# Patient Record
Sex: FEMALE | URBAN - METROPOLITAN AREA
[De-identification: names, ages, dates, MRNs, and addresses within clinical notes are randomized per-mention and may not be internally consistent; named-entity substitution may affect disease eponyms.]

---

## 2024-04-29 ENCOUNTER — HOSPITAL ENCOUNTER (INPATIENT)
Facility: MEDICAL CENTER | Age: 54
DRG: 917 | End: 2024-04-29
Attending: EMERGENCY MEDICINE | Admitting: INTERNAL MEDICINE
Payer: COMMERCIAL

## 2024-04-29 DIAGNOSIS — Z97.8 ENDOTRACHEALLY INTUBATED: ICD-10-CM

## 2024-04-29 DIAGNOSIS — T50.902A INTENTIONAL DRUG OVERDOSE, INITIAL ENCOUNTER (HCC): Primary | ICD-10-CM

## 2024-04-29 DIAGNOSIS — R40.2431 GLASGOW COMA SCALE TOTAL SCORE 3-8, IN THE FIELD (EMT OR AMBULANCE) (HCC): ICD-10-CM

## 2024-04-29 PROBLEM — F32.9 MAJOR DEPRESSIVE DISORDER: Status: ACTIVE | Noted: 2024-04-29

## 2024-04-29 PROBLEM — T17.908A ASPIRATION INTO RESPIRATORY TRACT: Status: ACTIVE | Noted: 2024-04-29

## 2024-04-29 PROBLEM — E87.6 HYPOKALEMIA: Status: ACTIVE | Noted: 2024-04-29

## 2024-04-29 PROBLEM — E03.9 HYPOTHYROIDISM: Status: ACTIVE | Noted: 2024-04-29

## 2024-04-29 PROBLEM — E11.65 TYPE 2 DIABETES MELLITUS WITH HYPERGLYCEMIA (HCC): Status: ACTIVE | Noted: 2024-04-29

## 2024-04-29 PROBLEM — J96.01 ACUTE RESPIRATORY FAILURE WITH HYPOXIA (HCC): Status: ACTIVE | Noted: 2024-04-29

## 2024-04-29 LAB
ALBUMIN SERPL BCP-MCNC: 4.3 G/DL (ref 3.2–4.9)
ALBUMIN SERPL BCP-MCNC: 4.3 G/DL (ref 3.2–4.9)
ALBUMIN/GLOB SERPL: 1.5 G/DL
ALBUMIN/GLOB SERPL: 1.5 G/DL
ALP SERPL-CCNC: 123 U/L (ref 30–99)
ALP SERPL-CCNC: 123 U/L (ref 30–99)
ALT SERPL-CCNC: 49 U/L (ref 2–50)
ALT SERPL-CCNC: 49 U/L (ref 2–50)
AMPHET UR QL SCN: NEGATIVE
AMPHET UR QL SCN: NEGATIVE
ANION GAP SERPL CALC-SCNC: 17 MMOL/L (ref 7–16)
ANION GAP SERPL CALC-SCNC: 17 MMOL/L (ref 7–16)
APAP SERPL-MCNC: <5 UG/ML (ref 10–30)
APAP SERPL-MCNC: <5 UG/ML (ref 10–30)
ARTERIAL PATENCY WRIST A: ABNORMAL
ARTERIAL PATENCY WRIST A: ABNORMAL
AST SERPL-CCNC: 40 U/L (ref 12–45)
AST SERPL-CCNC: 40 U/L (ref 12–45)
BARBITURATES UR QL SCN: NEGATIVE
BARBITURATES UR QL SCN: NEGATIVE
BASE EXCESS BLDA CALC-SCNC: -6 MMOL/L (ref -4–3)
BASE EXCESS BLDA CALC-SCNC: -6 MMOL/L (ref -4–3)
BASOPHILS # BLD AUTO: 0.6 % (ref 0–1.8)
BASOPHILS # BLD AUTO: 0.6 % (ref 0–1.8)
BASOPHILS # BLD: 0.05 K/UL (ref 0–0.12)
BASOPHILS # BLD: 0.05 K/UL (ref 0–0.12)
BENZODIAZ UR QL SCN: NEGATIVE
BENZODIAZ UR QL SCN: NEGATIVE
BILIRUB SERPL-MCNC: 0.4 MG/DL (ref 0.1–1.5)
BILIRUB SERPL-MCNC: 0.4 MG/DL (ref 0.1–1.5)
BODY TEMPERATURE: ABNORMAL DEGREES
BODY TEMPERATURE: ABNORMAL DEGREES
BREATHS SETTING VENT: 20
BREATHS SETTING VENT: 20
BUN SERPL-MCNC: 16 MG/DL (ref 8–22)
BUN SERPL-MCNC: 16 MG/DL (ref 8–22)
BZE UR QL SCN: NEGATIVE
BZE UR QL SCN: NEGATIVE
CALCIUM ALBUM COR SERPL-MCNC: 8.2 MG/DL (ref 8.5–10.5)
CALCIUM ALBUM COR SERPL-MCNC: 8.2 MG/DL (ref 8.5–10.5)
CALCIUM SERPL-MCNC: 8.4 MG/DL (ref 8.5–10.5)
CALCIUM SERPL-MCNC: 8.4 MG/DL (ref 8.5–10.5)
CANNABINOIDS UR QL SCN: NEGATIVE
CANNABINOIDS UR QL SCN: NEGATIVE
CHLORIDE SERPL-SCNC: 100 MMOL/L (ref 96–112)
CHLORIDE SERPL-SCNC: 100 MMOL/L (ref 96–112)
CO2 BLDA-SCNC: 25 MMOL/L (ref 20–33)
CO2 BLDA-SCNC: 25 MMOL/L (ref 20–33)
CO2 SERPL-SCNC: 20 MMOL/L (ref 20–33)
CO2 SERPL-SCNC: 20 MMOL/L (ref 20–33)
CREAT SERPL-MCNC: 0.7 MG/DL (ref 0.5–1.4)
CREAT SERPL-MCNC: 0.7 MG/DL (ref 0.5–1.4)
DELSYS IDSYS: ABNORMAL
DELSYS IDSYS: ABNORMAL
EKG IMPRESSION: NORMAL
EKG IMPRESSION: NORMAL
EOSINOPHIL # BLD AUTO: 0.11 K/UL (ref 0–0.51)
EOSINOPHIL # BLD AUTO: 0.11 K/UL (ref 0–0.51)
EOSINOPHIL NFR BLD: 1.2 % (ref 0–6.9)
EOSINOPHIL NFR BLD: 1.2 % (ref 0–6.9)
ERYTHROCYTE [DISTWIDTH] IN BLOOD BY AUTOMATED COUNT: 43.8 FL (ref 35.9–50)
ERYTHROCYTE [DISTWIDTH] IN BLOOD BY AUTOMATED COUNT: 43.8 FL (ref 35.9–50)
ETHANOL BLD-MCNC: <10.1 MG/DL
ETHANOL BLD-MCNC: <10.1 MG/DL
FENTANYL UR QL: NEGATIVE
FENTANYL UR QL: NEGATIVE
GFR SERPLBLD CREATININE-BSD FMLA CKD-EPI: 101 ML/MIN/1.73 M 2
GFR SERPLBLD CREATININE-BSD FMLA CKD-EPI: 101 ML/MIN/1.73 M 2
GLOBULIN SER CALC-MCNC: 2.8 G/DL (ref 1.9–3.5)
GLOBULIN SER CALC-MCNC: 2.8 G/DL (ref 1.9–3.5)
GLUCOSE BLD STRIP.AUTO-MCNC: 261 MG/DL (ref 65–99)
GLUCOSE BLD STRIP.AUTO-MCNC: 261 MG/DL (ref 65–99)
GLUCOSE SERPL-MCNC: 290 MG/DL (ref 65–99)
GLUCOSE SERPL-MCNC: 290 MG/DL (ref 65–99)
HCO3 BLDA-SCNC: 23.1 MMOL/L (ref 17–25)
HCO3 BLDA-SCNC: 23.1 MMOL/L (ref 17–25)
HCT VFR BLD AUTO: 41.7 % (ref 37–47)
HCT VFR BLD AUTO: 41.7 % (ref 37–47)
HGB BLD-MCNC: 13.7 G/DL (ref 12–16)
HGB BLD-MCNC: 13.7 G/DL (ref 12–16)
HOROWITZ INDEX BLDA+IHG-RTO: 142 MM[HG]
HOROWITZ INDEX BLDA+IHG-RTO: 142 MM[HG]
IMM GRANULOCYTES # BLD AUTO: 0.1 K/UL (ref 0–0.11)
IMM GRANULOCYTES # BLD AUTO: 0.1 K/UL (ref 0–0.11)
IMM GRANULOCYTES NFR BLD AUTO: 1.1 % (ref 0–0.9)
IMM GRANULOCYTES NFR BLD AUTO: 1.1 % (ref 0–0.9)
LACTATE BLD-SCNC: 1.7 MMOL/L (ref 0.5–2)
LACTATE BLD-SCNC: 1.7 MMOL/L (ref 0.5–2)
LACTATE SERPL-SCNC: 2.4 MMOL/L (ref 0.5–2)
LACTATE SERPL-SCNC: 2.4 MMOL/L (ref 0.5–2)
LYMPHOCYTES # BLD AUTO: 1.18 K/UL (ref 1–4.8)
LYMPHOCYTES # BLD AUTO: 1.18 K/UL (ref 1–4.8)
LYMPHOCYTES NFR BLD: 13 % (ref 22–41)
LYMPHOCYTES NFR BLD: 13 % (ref 22–41)
MAGNESIUM SERPL-MCNC: 2 MG/DL (ref 1.5–2.5)
MAGNESIUM SERPL-MCNC: 2 MG/DL (ref 1.5–2.5)
MCH RBC QN AUTO: 29.2 PG (ref 27–33)
MCH RBC QN AUTO: 29.2 PG (ref 27–33)
MCHC RBC AUTO-ENTMCNC: 32.9 G/DL (ref 32.2–35.5)
MCHC RBC AUTO-ENTMCNC: 32.9 G/DL (ref 32.2–35.5)
MCV RBC AUTO: 88.9 FL (ref 81.4–97.8)
MCV RBC AUTO: 88.9 FL (ref 81.4–97.8)
METHADONE UR QL SCN: NEGATIVE
METHADONE UR QL SCN: NEGATIVE
MODE IMODE: ABNORMAL
MODE IMODE: ABNORMAL
MONOCYTES # BLD AUTO: 0.49 K/UL (ref 0–0.85)
MONOCYTES # BLD AUTO: 0.49 K/UL (ref 0–0.85)
MONOCYTES NFR BLD AUTO: 5.4 % (ref 0–13.4)
MONOCYTES NFR BLD AUTO: 5.4 % (ref 0–13.4)
NEUTROPHILS # BLD AUTO: 7.16 K/UL (ref 1.82–7.42)
NEUTROPHILS # BLD AUTO: 7.16 K/UL (ref 1.82–7.42)
NEUTROPHILS NFR BLD: 78.7 % (ref 44–72)
NEUTROPHILS NFR BLD: 78.7 % (ref 44–72)
NRBC # BLD AUTO: 0 K/UL
NRBC # BLD AUTO: 0 K/UL
NRBC BLD-RTO: 0 /100 WBC (ref 0–0.2)
NRBC BLD-RTO: 0 /100 WBC (ref 0–0.2)
O2/TOTAL GAS SETTING VFR VENT: 100 %
O2/TOTAL GAS SETTING VFR VENT: 100 %
OPIATES UR QL SCN: NEGATIVE
OPIATES UR QL SCN: NEGATIVE
OXYCODONE UR QL SCN: NEGATIVE
OXYCODONE UR QL SCN: NEGATIVE
PCO2 BLDA: 61.3 MMHG (ref 26–37)
PCO2 BLDA: 61.3 MMHG (ref 26–37)
PCO2 TEMP ADJ BLDA: 54.2 MMHG (ref 26–37)
PCO2 TEMP ADJ BLDA: 54.2 MMHG (ref 26–37)
PCP UR QL SCN: NEGATIVE
PCP UR QL SCN: NEGATIVE
PEEP END EXPIRATORY PRESSURE IPEEP: 8 CMH20
PEEP END EXPIRATORY PRESSURE IPEEP: 8 CMH20
PH BLDA: 7.18 [PH] (ref 7.4–7.5)
PH BLDA: 7.18 [PH] (ref 7.4–7.5)
PH TEMP ADJ BLDA: 7.22 [PH] (ref 7.4–7.5)
PH TEMP ADJ BLDA: 7.22 [PH] (ref 7.4–7.5)
PLATELET # BLD AUTO: 200 K/UL (ref 164–446)
PLATELET # BLD AUTO: 200 K/UL (ref 164–446)
PMV BLD AUTO: 10.7 FL (ref 9–12.9)
PMV BLD AUTO: 10.7 FL (ref 9–12.9)
PO2 BLDA: 142 MMHG (ref 64–87)
PO2 BLDA: 142 MMHG (ref 64–87)
PO2 TEMP ADJ BLDA: 126 MMHG (ref 64–87)
PO2 TEMP ADJ BLDA: 126 MMHG (ref 64–87)
POTASSIUM SERPL-SCNC: 2.9 MMOL/L (ref 3.6–5.5)
POTASSIUM SERPL-SCNC: 2.9 MMOL/L (ref 3.6–5.5)
PROPOXYPH UR QL SCN: NEGATIVE
PROPOXYPH UR QL SCN: NEGATIVE
PROT SERPL-MCNC: 7.1 G/DL (ref 6–8.2)
PROT SERPL-MCNC: 7.1 G/DL (ref 6–8.2)
RBC # BLD AUTO: 4.69 M/UL (ref 4.2–5.4)
RBC # BLD AUTO: 4.69 M/UL (ref 4.2–5.4)
SALICYLATES SERPL-MCNC: <1 MG/DL (ref 15–25)
SALICYLATES SERPL-MCNC: <1 MG/DL (ref 15–25)
SAO2 % BLDA: 98 % (ref 93–99)
SAO2 % BLDA: 98 % (ref 93–99)
SODIUM SERPL-SCNC: 137 MMOL/L (ref 135–145)
SODIUM SERPL-SCNC: 137 MMOL/L (ref 135–145)
SPECIMEN DRAWN FROM PATIENT: ABNORMAL
SPECIMEN DRAWN FROM PATIENT: ABNORMAL
TIDAL VOLUME IVT: 400 ML
TIDAL VOLUME IVT: 400 ML
TROPONIN T SERPL-MCNC: 36 NG/L (ref 6–19)
TROPONIN T SERPL-MCNC: 36 NG/L (ref 6–19)
WBC # BLD AUTO: 9.1 K/UL (ref 4.8–10.8)
WBC # BLD AUTO: 9.1 K/UL (ref 4.8–10.8)

## 2024-04-29 PROCEDURE — 82962 GLUCOSE BLOOD TEST: CPT

## 2024-04-29 PROCEDURE — 51702 INSERT TEMP BLADDER CATH: CPT

## 2024-04-29 PROCEDURE — 83735 ASSAY OF MAGNESIUM: CPT

## 2024-04-29 PROCEDURE — 99291 CRITICAL CARE FIRST HOUR: CPT

## 2024-04-29 PROCEDURE — 96365 THER/PROPH/DIAG IV INF INIT: CPT

## 2024-04-29 PROCEDURE — 80053 COMPREHEN METABOLIC PANEL: CPT

## 2024-04-29 PROCEDURE — 36600 WITHDRAWAL OF ARTERIAL BLOOD: CPT

## 2024-04-29 PROCEDURE — 304538 HCHG NG TUBE

## 2024-04-29 PROCEDURE — 94760 N-INVAS EAR/PLS OXIMETRY 1: CPT

## 2024-04-29 PROCEDURE — 93005 ELECTROCARDIOGRAM TRACING: CPT | Performed by: EMERGENCY MEDICINE

## 2024-04-29 PROCEDURE — 83605 ASSAY OF LACTIC ACID: CPT | Mod: 91

## 2024-04-29 PROCEDURE — 303105 HCHG CATHETER EXTRA

## 2024-04-29 PROCEDURE — 82077 ASSAY SPEC XCP UR&BREATH IA: CPT

## 2024-04-29 PROCEDURE — 84484 ASSAY OF TROPONIN QUANT: CPT

## 2024-04-29 PROCEDURE — 700105 HCHG RX REV CODE 258: Performed by: EMERGENCY MEDICINE

## 2024-04-29 PROCEDURE — 94002 VENT MGMT INPAT INIT DAY: CPT

## 2024-04-29 PROCEDURE — 36415 COLL VENOUS BLD VENIPUNCTURE: CPT

## 2024-04-29 PROCEDURE — 99291 CRITICAL CARE FIRST HOUR: CPT | Performed by: INTERNAL MEDICINE

## 2024-04-29 PROCEDURE — 770022 HCHG ROOM/CARE - ICU (200)

## 2024-04-29 PROCEDURE — 5A1935Z RESPIRATORY VENTILATION, LESS THAN 24 CONSECUTIVE HOURS: ICD-10-PCS | Performed by: INTERNAL MEDICINE

## 2024-04-29 PROCEDURE — 80307 DRUG TEST PRSMV CHEM ANLYZR: CPT

## 2024-04-29 PROCEDURE — 700101 HCHG RX REV CODE 250: Performed by: EMERGENCY MEDICINE

## 2024-04-29 PROCEDURE — 96375 TX/PRO/DX INJ NEW DRUG ADDON: CPT

## 2024-04-29 PROCEDURE — 85025 COMPLETE CBC W/AUTO DIFF WBC: CPT

## 2024-04-29 PROCEDURE — 700111 HCHG RX REV CODE 636 W/ 250 OVERRIDE (IP): Mod: JZ | Performed by: INTERNAL MEDICINE

## 2024-04-29 PROCEDURE — 82803 BLOOD GASES ANY COMBINATION: CPT

## 2024-04-29 PROCEDURE — 99292 CRITICAL CARE ADDL 30 MIN: CPT | Performed by: INTERNAL MEDICINE

## 2024-04-29 PROCEDURE — 80179 DRUG ASSAY SALICYLATE: CPT

## 2024-04-29 PROCEDURE — 80143 DRUG ASSAY ACETAMINOPHEN: CPT

## 2024-04-29 RX ORDER — ONDANSETRON 4 MG/1
4 TABLET, ORALLY DISINTEGRATING ORAL EVERY 4 HOURS PRN
Status: DISCONTINUED | OUTPATIENT
Start: 2024-04-29 | End: 2024-04-30

## 2024-04-29 RX ORDER — HYDROXYZINE PAMOATE 25 MG/1
25 CAPSULE ORAL
COMMUNITY

## 2024-04-29 RX ORDER — ROSUVASTATIN CALCIUM 20 MG/1
20 TABLET, COATED ORAL DAILY
COMMUNITY

## 2024-04-29 RX ORDER — BISACODYL 10 MG
10 SUPPOSITORY, RECTAL RECTAL
Status: DISCONTINUED | OUTPATIENT
Start: 2024-04-29 | End: 2024-05-01

## 2024-04-29 RX ORDER — PROMETHAZINE HYDROCHLORIDE 25 MG/1
12.5-25 TABLET ORAL EVERY 4 HOURS PRN
Status: DISCONTINUED | OUTPATIENT
Start: 2024-04-29 | End: 2024-04-30

## 2024-04-29 RX ORDER — ONDANSETRON 2 MG/ML
4 INJECTION INTRAMUSCULAR; INTRAVENOUS EVERY 4 HOURS PRN
Status: DISCONTINUED | OUTPATIENT
Start: 2024-04-29 | End: 2024-05-03 | Stop reason: HOSPADM

## 2024-04-29 RX ORDER — ACETAMINOPHEN 325 MG/1
650 TABLET ORAL EVERY 6 HOURS PRN
Status: DISCONTINUED | OUTPATIENT
Start: 2024-04-29 | End: 2024-04-30

## 2024-04-29 RX ORDER — DEXTROSE MONOHYDRATE 25 G/50ML
25 INJECTION, SOLUTION INTRAVENOUS
Status: DISCONTINUED | OUTPATIENT
Start: 2024-04-29 | End: 2024-05-01

## 2024-04-29 RX ORDER — FAMOTIDINE 20 MG/1
20 TABLET, FILM COATED ORAL EVERY 12 HOURS
Status: DISCONTINUED | OUTPATIENT
Start: 2024-04-30 | End: 2024-04-30

## 2024-04-29 RX ORDER — POLYETHYLENE GLYCOL 3350 17 G/17G
1 POWDER, FOR SOLUTION ORAL
Status: DISCONTINUED | OUTPATIENT
Start: 2024-04-29 | End: 2024-05-01

## 2024-04-29 RX ORDER — GABAPENTIN 100 MG/1
100 CAPSULE ORAL 3 TIMES DAILY
COMMUNITY

## 2024-04-29 RX ORDER — ESCITALOPRAM OXALATE 10 MG/1
10 TABLET ORAL DAILY
COMMUNITY

## 2024-04-29 RX ORDER — PROCHLORPERAZINE EDISYLATE 5 MG/ML
5-10 INJECTION INTRAMUSCULAR; INTRAVENOUS EVERY 4 HOURS PRN
Status: DISCONTINUED | OUTPATIENT
Start: 2024-04-29 | End: 2024-05-03 | Stop reason: HOSPADM

## 2024-04-29 RX ORDER — AMOXICILLIN 250 MG
2 CAPSULE ORAL 2 TIMES DAILY
Status: DISCONTINUED | OUTPATIENT
Start: 2024-04-29 | End: 2024-05-01

## 2024-04-29 RX ORDER — SODIUM CHLORIDE AND POTASSIUM CHLORIDE 300; 900 MG/100ML; MG/100ML
INJECTION, SOLUTION INTRAVENOUS CONTINUOUS
Status: DISCONTINUED | OUTPATIENT
Start: 2024-04-29 | End: 2024-04-30

## 2024-04-29 RX ORDER — LEVOTHYROXINE SODIUM 0.15 MG/1
150 TABLET ORAL DAILY
COMMUNITY

## 2024-04-29 RX ORDER — SODIUM CHLORIDE 9 MG/ML
1000 INJECTION, SOLUTION INTRAVENOUS ONCE
Status: COMPLETED | OUTPATIENT
Start: 2024-04-29 | End: 2024-04-29

## 2024-04-29 RX ORDER — PROMETHAZINE HYDROCHLORIDE 25 MG/1
12.5-25 SUPPOSITORY RECTAL EVERY 4 HOURS PRN
Status: DISCONTINUED | OUTPATIENT
Start: 2024-04-29 | End: 2024-05-03 | Stop reason: HOSPADM

## 2024-04-29 RX ORDER — ENOXAPARIN SODIUM 100 MG/ML
40 INJECTION SUBCUTANEOUS DAILY
Status: DISCONTINUED | OUTPATIENT
Start: 2024-04-29 | End: 2024-05-03 | Stop reason: HOSPADM

## 2024-04-29 RX ORDER — QUETIAPINE FUMARATE 50 MG/1
50 TABLET, FILM COATED ORAL DAILY
COMMUNITY

## 2024-04-29 RX ORDER — IPRATROPIUM BROMIDE AND ALBUTEROL SULFATE 2.5; .5 MG/3ML; MG/3ML
3 SOLUTION RESPIRATORY (INHALATION)
Status: DISCONTINUED | OUTPATIENT
Start: 2024-04-29 | End: 2024-05-03 | Stop reason: HOSPADM

## 2024-04-29 RX ORDER — SODIUM CHLORIDE, SODIUM LACTATE, POTASSIUM CHLORIDE, CALCIUM CHLORIDE 600; 310; 30; 20 MG/100ML; MG/100ML; MG/100ML; MG/100ML
INJECTION, SOLUTION INTRAVENOUS CONTINUOUS
Status: DISCONTINUED | OUTPATIENT
Start: 2024-04-29 | End: 2024-04-30

## 2024-04-29 RX ADMIN — POTASSIUM CHLORIDE AND SODIUM CHLORIDE: 900; 300 INJECTION, SOLUTION INTRAVENOUS at 23:17

## 2024-04-29 RX ADMIN — FENTANYL CITRATE 100 MCG: 50 INJECTION, SOLUTION INTRAMUSCULAR; INTRAVENOUS at 23:48

## 2024-04-29 RX ADMIN — FENTANYL CITRATE 50 MCG: 50 INJECTION, SOLUTION INTRAMUSCULAR; INTRAVENOUS at 23:34

## 2024-04-29 RX ADMIN — SODIUM CHLORIDE 1000 ML: 9 INJECTION, SOLUTION INTRAVENOUS at 22:01

## 2024-04-30 VITALS
HEIGHT: 67 IN | DIASTOLIC BLOOD PRESSURE: 58 MMHG | OXYGEN SATURATION: 97 % | WEIGHT: 250 LBS | BODY MASS INDEX: 39.24 KG/M2 | TEMPERATURE: 99 F | SYSTOLIC BLOOD PRESSURE: 121 MMHG | HEART RATE: 86 BPM | RESPIRATION RATE: 19 BRPM

## 2024-04-30 LAB
ANION GAP SERPL CALC-SCNC: 10 MMOL/L (ref 7–16)
ANION GAP SERPL CALC-SCNC: 12 MMOL/L (ref 7–16)
ANION GAP SERPL CALC-SCNC: 12 MMOL/L (ref 7–16)
APPEARANCE UR: CLEAR
APPEARANCE UR: CLEAR
BACTERIA #/AREA URNS HPF: NEGATIVE /HPF
BACTERIA #/AREA URNS HPF: NEGATIVE /HPF
BACTERIA SPEC RESP CULT: NORMAL
BASE EXCESS BLDA CALC-SCNC: -7 MMOL/L (ref -4–3)
BASE EXCESS BLDA CALC-SCNC: -7 MMOL/L (ref -4–3)
BASOPHILS # BLD AUTO: 0.2 % (ref 0–1.8)
BASOPHILS # BLD AUTO: 0.2 % (ref 0–1.8)
BASOPHILS # BLD: 0.02 K/UL (ref 0–0.12)
BASOPHILS # BLD: 0.02 K/UL (ref 0–0.12)
BILIRUB UR QL STRIP.AUTO: NEGATIVE
BILIRUB UR QL STRIP.AUTO: NEGATIVE
BODY TEMPERATURE: ABNORMAL DEGREES
BODY TEMPERATURE: ABNORMAL DEGREES
BREATHS SETTING VENT: 24
BREATHS SETTING VENT: 24
BUN SERPL-MCNC: 11 MG/DL (ref 8–22)
BUN SERPL-MCNC: 11 MG/DL (ref 8–22)
BUN SERPL-MCNC: 14 MG/DL (ref 8–22)
BUN SERPL-MCNC: 14 MG/DL (ref 8–22)
BUN SERPL-MCNC: 15 MG/DL (ref 8–22)
BUN SERPL-MCNC: 15 MG/DL (ref 8–22)
CALCIUM SERPL-MCNC: 7.5 MG/DL (ref 8.5–10.5)
CALCIUM SERPL-MCNC: 7.5 MG/DL (ref 8.5–10.5)
CALCIUM SERPL-MCNC: 7.8 MG/DL (ref 8.5–10.5)
CALCIUM SERPL-MCNC: 7.8 MG/DL (ref 8.5–10.5)
CALCIUM SERPL-MCNC: 8 MG/DL (ref 8.5–10.5)
CALCIUM SERPL-MCNC: 8 MG/DL (ref 8.5–10.5)
CHLORIDE SERPL-SCNC: 106 MMOL/L (ref 96–112)
CHLORIDE SERPL-SCNC: 106 MMOL/L (ref 96–112)
CHLORIDE SERPL-SCNC: 108 MMOL/L (ref 96–112)
CHLORIDE SERPL-SCNC: 108 MMOL/L (ref 96–112)
CHLORIDE SERPL-SCNC: 109 MMOL/L (ref 96–112)
CHLORIDE SERPL-SCNC: 109 MMOL/L (ref 96–112)
CO2 BLDA-SCNC: 21 MMOL/L (ref 20–33)
CO2 BLDA-SCNC: 21 MMOL/L (ref 20–33)
CO2 SERPL-SCNC: 19 MMOL/L (ref 20–33)
CO2 SERPL-SCNC: 19 MMOL/L (ref 20–33)
CO2 SERPL-SCNC: 21 MMOL/L (ref 20–33)
CO2 SERPL-SCNC: 21 MMOL/L (ref 20–33)
CO2 SERPL-SCNC: 22 MMOL/L (ref 20–33)
CO2 SERPL-SCNC: 22 MMOL/L (ref 20–33)
COLOR UR: YELLOW
COLOR UR: YELLOW
CREAT SERPL-MCNC: 0.45 MG/DL (ref 0.5–1.4)
CREAT SERPL-MCNC: 0.45 MG/DL (ref 0.5–1.4)
CREAT SERPL-MCNC: 0.56 MG/DL (ref 0.5–1.4)
CREAT SERPL-MCNC: 0.56 MG/DL (ref 0.5–1.4)
CREAT SERPL-MCNC: 0.68 MG/DL (ref 0.5–1.4)
CREAT SERPL-MCNC: 0.68 MG/DL (ref 0.5–1.4)
CYTOLOGY REG CYTOL: NORMAL
CYTOLOGY REG CYTOL: NORMAL
DELSYS IDSYS: ABNORMAL
DELSYS IDSYS: ABNORMAL
EKG IMPRESSION: NORMAL
EKG IMPRESSION: NORMAL
END TIDAL CARBON DIOXIDE IECO2: 36 MMHG
END TIDAL CARBON DIOXIDE IECO2: 36 MMHG
EOSINOPHIL # BLD AUTO: 0.01 K/UL (ref 0–0.51)
EOSINOPHIL # BLD AUTO: 0.01 K/UL (ref 0–0.51)
EOSINOPHIL NFR BLD: 0.1 % (ref 0–6.9)
EOSINOPHIL NFR BLD: 0.1 % (ref 0–6.9)
EPI CELLS #/AREA URNS HPF: NEGATIVE /HPF
EPI CELLS #/AREA URNS HPF: NEGATIVE /HPF
ERYTHROCYTE [DISTWIDTH] IN BLOOD BY AUTOMATED COUNT: 43.8 FL (ref 35.9–50)
ERYTHROCYTE [DISTWIDTH] IN BLOOD BY AUTOMATED COUNT: 43.8 FL (ref 35.9–50)
FUNGUS SPEC CULT: NORMAL
FUNGUS SPEC FUNGUS STN: NORMAL
GFR SERPLBLD CREATININE-BSD FMLA CKD-EPI: 102 ML/MIN/1.73 M 2
GFR SERPLBLD CREATININE-BSD FMLA CKD-EPI: 102 ML/MIN/1.73 M 2
GFR SERPLBLD CREATININE-BSD FMLA CKD-EPI: 107 ML/MIN/1.73 M 2
GFR SERPLBLD CREATININE-BSD FMLA CKD-EPI: 107 ML/MIN/1.73 M 2
GFR SERPLBLD CREATININE-BSD FMLA CKD-EPI: 113 ML/MIN/1.73 M 2
GFR SERPLBLD CREATININE-BSD FMLA CKD-EPI: 113 ML/MIN/1.73 M 2
GLUCOSE BLD STRIP.AUTO-MCNC: 120 MG/DL (ref 65–99)
GLUCOSE BLD STRIP.AUTO-MCNC: 120 MG/DL (ref 65–99)
GLUCOSE BLD STRIP.AUTO-MCNC: 151 MG/DL (ref 65–99)
GLUCOSE BLD STRIP.AUTO-MCNC: 151 MG/DL (ref 65–99)
GLUCOSE BLD STRIP.AUTO-MCNC: 248 MG/DL (ref 65–99)
GLUCOSE BLD STRIP.AUTO-MCNC: 248 MG/DL (ref 65–99)
GLUCOSE BLD STRIP.AUTO-MCNC: 96 MG/DL (ref 65–99)
GLUCOSE BLD STRIP.AUTO-MCNC: 96 MG/DL (ref 65–99)
GLUCOSE SERPL-MCNC: 115 MG/DL (ref 65–99)
GLUCOSE SERPL-MCNC: 115 MG/DL (ref 65–99)
GLUCOSE SERPL-MCNC: 152 MG/DL (ref 65–99)
GLUCOSE SERPL-MCNC: 152 MG/DL (ref 65–99)
GLUCOSE SERPL-MCNC: 166 MG/DL (ref 65–99)
GLUCOSE SERPL-MCNC: 166 MG/DL (ref 65–99)
GLUCOSE UR STRIP.AUTO-MCNC: NEGATIVE MG/DL
GLUCOSE UR STRIP.AUTO-MCNC: NEGATIVE MG/DL
GRAM STN SPEC: NORMAL
HCO3 BLDA-SCNC: 19.5 MMOL/L (ref 17–25)
HCO3 BLDA-SCNC: 19.5 MMOL/L (ref 17–25)
HCT VFR BLD AUTO: 34 % (ref 37–47)
HCT VFR BLD AUTO: 34 % (ref 37–47)
HGB BLD-MCNC: 11 G/DL (ref 12–16)
HGB BLD-MCNC: 11 G/DL (ref 12–16)
HOROWITZ INDEX BLDA+IHG-RTO: 176 MM[HG]
HOROWITZ INDEX BLDA+IHG-RTO: 176 MM[HG]
HYALINE CASTS #/AREA URNS LPF: ABNORMAL /LPF
HYALINE CASTS #/AREA URNS LPF: ABNORMAL /LPF
IMM GRANULOCYTES # BLD AUTO: 0.03 K/UL (ref 0–0.11)
IMM GRANULOCYTES # BLD AUTO: 0.03 K/UL (ref 0–0.11)
IMM GRANULOCYTES NFR BLD AUTO: 0.3 % (ref 0–0.9)
IMM GRANULOCYTES NFR BLD AUTO: 0.3 % (ref 0–0.9)
KETONES UR STRIP.AUTO-MCNC: NEGATIVE MG/DL
KETONES UR STRIP.AUTO-MCNC: NEGATIVE MG/DL
LEUKOCYTE ESTERASE UR QL STRIP.AUTO: ABNORMAL
LEUKOCYTE ESTERASE UR QL STRIP.AUTO: ABNORMAL
LYMPHOCYTES # BLD AUTO: 0.64 K/UL (ref 1–4.8)
LYMPHOCYTES # BLD AUTO: 0.64 K/UL (ref 1–4.8)
LYMPHOCYTES NFR BLD: 7.4 % (ref 22–41)
LYMPHOCYTES NFR BLD: 7.4 % (ref 22–41)
MAGNESIUM SERPL-MCNC: 1.7 MG/DL (ref 1.5–2.5)
MAGNESIUM SERPL-MCNC: 1.7 MG/DL (ref 1.5–2.5)
MCH RBC QN AUTO: 28.7 PG (ref 27–33)
MCH RBC QN AUTO: 28.7 PG (ref 27–33)
MCHC RBC AUTO-ENTMCNC: 32.4 G/DL (ref 32.2–35.5)
MCHC RBC AUTO-ENTMCNC: 32.4 G/DL (ref 32.2–35.5)
MCV RBC AUTO: 88.8 FL (ref 81.4–97.8)
MCV RBC AUTO: 88.8 FL (ref 81.4–97.8)
MICRO URNS: ABNORMAL
MICRO URNS: ABNORMAL
MODE IMODE: ABNORMAL
MODE IMODE: ABNORMAL
MONOCYTES # BLD AUTO: 0.3 K/UL (ref 0–0.85)
MONOCYTES # BLD AUTO: 0.3 K/UL (ref 0–0.85)
MONOCYTES NFR BLD AUTO: 3.5 % (ref 0–13.4)
MONOCYTES NFR BLD AUTO: 3.5 % (ref 0–13.4)
MYCOBACTERIUM SPEC CULT: NORMAL
MYCOBACTERIUM SPEC CULT: NORMAL
NEUTROPHILS # BLD AUTO: 7.69 K/UL (ref 1.82–7.42)
NEUTROPHILS # BLD AUTO: 7.69 K/UL (ref 1.82–7.42)
NEUTROPHILS NFR BLD: 88.5 % (ref 44–72)
NEUTROPHILS NFR BLD: 88.5 % (ref 44–72)
NITRITE UR QL STRIP.AUTO: NEGATIVE
NITRITE UR QL STRIP.AUTO: NEGATIVE
NRBC # BLD AUTO: 0 K/UL
NRBC # BLD AUTO: 0 K/UL
NRBC BLD-RTO: 0 /100 WBC (ref 0–0.2)
NRBC BLD-RTO: 0 /100 WBC (ref 0–0.2)
O2/TOTAL GAS SETTING VFR VENT: 50 %
O2/TOTAL GAS SETTING VFR VENT: 50 %
PCO2 BLDA: 40.4 MMHG (ref 26–37)
PCO2 BLDA: 40.4 MMHG (ref 26–37)
PCO2 TEMP ADJ BLDA: 38.2 MMHG (ref 26–37)
PCO2 TEMP ADJ BLDA: 38.2 MMHG (ref 26–37)
PEEP END EXPIRATORY PRESSURE IPEEP: 8 CMH20
PEEP END EXPIRATORY PRESSURE IPEEP: 8 CMH20
PH BLDA: 7.29 [PH] (ref 7.4–7.5)
PH BLDA: 7.29 [PH] (ref 7.4–7.5)
PH TEMP ADJ BLDA: 7.31 [PH] (ref 7.4–7.5)
PH TEMP ADJ BLDA: 7.31 [PH] (ref 7.4–7.5)
PH UR STRIP.AUTO: 5.5 [PH] (ref 5–8)
PH UR STRIP.AUTO: 5.5 [PH] (ref 5–8)
PHOSPHATE SERPL-MCNC: 3.1 MG/DL (ref 2.5–4.5)
PHOSPHATE SERPL-MCNC: 3.1 MG/DL (ref 2.5–4.5)
PLATELET # BLD AUTO: 200 K/UL (ref 164–446)
PLATELET # BLD AUTO: 200 K/UL (ref 164–446)
PMV BLD AUTO: 10.9 FL (ref 9–12.9)
PMV BLD AUTO: 10.9 FL (ref 9–12.9)
PO2 BLDA: 88 MMHG (ref 64–87)
PO2 BLDA: 88 MMHG (ref 64–87)
PO2 TEMP ADJ BLDA: 81 MMHG (ref 64–87)
PO2 TEMP ADJ BLDA: 81 MMHG (ref 64–87)
POTASSIUM SERPL-SCNC: 3.7 MMOL/L (ref 3.6–5.5)
POTASSIUM SERPL-SCNC: 3.7 MMOL/L (ref 3.6–5.5)
POTASSIUM SERPL-SCNC: 4 MMOL/L (ref 3.6–5.5)
POTASSIUM SERPL-SCNC: 4 MMOL/L (ref 3.6–5.5)
POTASSIUM SERPL-SCNC: 5.9 MMOL/L (ref 3.6–5.5)
POTASSIUM SERPL-SCNC: 5.9 MMOL/L (ref 3.6–5.5)
PROCALCITONIN SERPL-MCNC: 0.22 NG/ML
PROCALCITONIN SERPL-MCNC: 0.22 NG/ML
PROT UR QL STRIP: NEGATIVE MG/DL
PROT UR QL STRIP: NEGATIVE MG/DL
RBC # BLD AUTO: 3.83 M/UL (ref 4.2–5.4)
RBC # BLD AUTO: 3.83 M/UL (ref 4.2–5.4)
RBC # URNS HPF: ABNORMAL /HPF
RBC # URNS HPF: ABNORMAL /HPF
RBC UR QL AUTO: ABNORMAL
RBC UR QL AUTO: ABNORMAL
RHODAMINE-AURAMINE STN SPEC: NORMAL
SAO2 % BLDA: 96 % (ref 93–99)
SAO2 % BLDA: 96 % (ref 93–99)
SIGNIFICANT IND 70042: NORMAL
SITE SITE: NORMAL
SODIUM SERPL-SCNC: 138 MMOL/L (ref 135–145)
SODIUM SERPL-SCNC: 138 MMOL/L (ref 135–145)
SODIUM SERPL-SCNC: 139 MMOL/L (ref 135–145)
SODIUM SERPL-SCNC: 139 MMOL/L (ref 135–145)
SODIUM SERPL-SCNC: 140 MMOL/L (ref 135–145)
SODIUM SERPL-SCNC: 140 MMOL/L (ref 135–145)
SOURCE SOURCE: NORMAL
SP GR UR STRIP.AUTO: 1.01
SP GR UR STRIP.AUTO: 1.01
SPECIMEN DRAWN FROM PATIENT: ABNORMAL
SPECIMEN DRAWN FROM PATIENT: ABNORMAL
TIDAL VOLUME IVT: 370 ML
TIDAL VOLUME IVT: 370 ML
TSH SERPL DL<=0.005 MIU/L-ACNC: 3.47 UIU/ML (ref 0.38–5.33)
TSH SERPL DL<=0.005 MIU/L-ACNC: 3.47 UIU/ML (ref 0.38–5.33)
UROBILINOGEN UR STRIP.AUTO-MCNC: 0.2 MG/DL
UROBILINOGEN UR STRIP.AUTO-MCNC: 0.2 MG/DL
WBC # BLD AUTO: 8.7 K/UL (ref 4.8–10.8)
WBC # BLD AUTO: 8.7 K/UL (ref 4.8–10.8)
WBC #/AREA URNS HPF: ABNORMAL /HPF
WBC #/AREA URNS HPF: ABNORMAL /HPF

## 2024-04-30 PROCEDURE — 94003 VENT MGMT INPAT SUBQ DAY: CPT

## 2024-04-30 PROCEDURE — 302978 HCHG BRONCHOSCOPY-DIAGNOSTIC

## 2024-04-30 PROCEDURE — 31624 DX BRONCHOSCOPE/LAVAGE: CPT | Performed by: INTERNAL MEDICINE

## 2024-04-30 PROCEDURE — 700111 HCHG RX REV CODE 636 W/ 250 OVERRIDE (IP): Mod: JZ | Performed by: INTERNAL MEDICINE

## 2024-04-30 PROCEDURE — 700102 HCHG RX REV CODE 250 W/ 637 OVERRIDE(OP): Performed by: INTERNAL MEDICINE

## 2024-04-30 PROCEDURE — 84443 ASSAY THYROID STIM HORMONE: CPT

## 2024-04-30 PROCEDURE — 99291 CRITICAL CARE FIRST HOUR: CPT | Mod: 25,GC | Performed by: INTERNAL MEDICINE

## 2024-04-30 PROCEDURE — 770022 HCHG ROOM/CARE - ICU (200)

## 2024-04-30 PROCEDURE — 700101 HCHG RX REV CODE 250: Performed by: EMERGENCY MEDICINE

## 2024-04-30 PROCEDURE — 99222 1ST HOSP IP/OBS MODERATE 55: CPT | Performed by: PSYCHIATRY & NEUROLOGY

## 2024-04-30 PROCEDURE — 99152 MOD SED SAME PHYS/QHP 5/>YRS: CPT

## 2024-04-30 PROCEDURE — 99292 CRITICAL CARE ADDL 30 MIN: CPT | Mod: 25,GC | Performed by: INTERNAL MEDICINE

## 2024-04-30 PROCEDURE — 0B9J8ZX DRAINAGE OF LEFT LOWER LUNG LOBE, VIA NATURAL OR ARTIFICIAL OPENING ENDOSCOPIC, DIAGNOSTIC: ICD-10-PCS | Performed by: INTERNAL MEDICINE

## 2024-04-30 PROCEDURE — 36600 WITHDRAWAL OF ARTERIAL BLOOD: CPT

## 2024-04-30 PROCEDURE — 93005 ELECTROCARDIOGRAM TRACING: CPT

## 2024-04-30 PROCEDURE — 700105 HCHG RX REV CODE 258: Performed by: INTERNAL MEDICINE

## 2024-04-30 PROCEDURE — 700111 HCHG RX REV CODE 636 W/ 250 OVERRIDE (IP): Mod: JZ

## 2024-04-30 PROCEDURE — 93010 ELECTROCARDIOGRAM REPORT: CPT | Performed by: INTERNAL MEDICINE

## 2024-04-30 PROCEDURE — A9270 NON-COVERED ITEM OR SERVICE: HCPCS | Performed by: INTERNAL MEDICINE

## 2024-04-30 PROCEDURE — 94799 UNLISTED PULMONARY SVC/PX: CPT

## 2024-04-30 PROCEDURE — 700105 HCHG RX REV CODE 258

## 2024-04-30 PROCEDURE — 31645 BRNCHSC W/THER ASPIR 1ST: CPT | Performed by: INTERNAL MEDICINE

## 2024-04-30 RX ORDER — SODIUM CHLORIDE 9 MG/ML
INJECTION, SOLUTION INTRAVENOUS CONTINUOUS
Status: DISCONTINUED | OUTPATIENT
Start: 2024-04-30 | End: 2024-05-01

## 2024-04-30 RX ORDER — NOREPINEPHRINE BITARTRATE 0.03 MG/ML
0-1 INJECTION, SOLUTION INTRAVENOUS CONTINUOUS
Status: DISCONTINUED | OUTPATIENT
Start: 2024-04-30 | End: 2024-04-30

## 2024-04-30 RX ORDER — LIDOCAINE HYDROCHLORIDE 20 MG/ML
15 SOLUTION OROPHARYNGEAL
Status: DISCONTINUED | OUTPATIENT
Start: 2024-04-30 | End: 2024-05-03 | Stop reason: HOSPADM

## 2024-04-30 RX ORDER — PROMETHAZINE HYDROCHLORIDE 25 MG/1
12.5-25 TABLET ORAL EVERY 4 HOURS PRN
Status: DISCONTINUED | OUTPATIENT
Start: 2024-04-30 | End: 2024-05-01

## 2024-04-30 RX ORDER — ACETAMINOPHEN 325 MG/1
650 TABLET ORAL EVERY 6 HOURS PRN
Status: DISCONTINUED | OUTPATIENT
Start: 2024-04-30 | End: 2024-05-01

## 2024-04-30 RX ORDER — DEXMEDETOMIDINE HYDROCHLORIDE 4 UG/ML
.1-1.5 INJECTION, SOLUTION INTRAVENOUS CONTINUOUS
Status: DISCONTINUED | OUTPATIENT
Start: 2024-04-30 | End: 2024-04-30

## 2024-04-30 RX ORDER — MAGNESIUM SULFATE HEPTAHYDRATE 40 MG/ML
2 INJECTION, SOLUTION INTRAVENOUS ONCE
Status: COMPLETED | OUTPATIENT
Start: 2024-04-30 | End: 2024-04-30

## 2024-04-30 RX ORDER — ONDANSETRON 4 MG/1
4 TABLET, ORALLY DISINTEGRATING ORAL EVERY 4 HOURS PRN
Status: DISCONTINUED | OUTPATIENT
Start: 2024-04-30 | End: 2024-05-01

## 2024-04-30 RX ADMIN — ENOXAPARIN SODIUM 40 MG: 100 INJECTION SUBCUTANEOUS at 00:28

## 2024-04-30 RX ADMIN — INSULIN HUMAN 3 UNITS: 100 INJECTION, SOLUTION PARENTERAL at 00:31

## 2024-04-30 RX ADMIN — FAMOTIDINE 20 MG: 10 INJECTION, SOLUTION INTRAVENOUS at 05:14

## 2024-04-30 RX ADMIN — SODIUM BICARBONATE 50 MEQ: 84 INJECTION INTRAVENOUS at 06:03

## 2024-04-30 RX ADMIN — INSULIN HUMAN 2 UNITS: 100 INJECTION, SOLUTION PARENTERAL at 23:18

## 2024-04-30 RX ADMIN — SENNOSIDES AND DOCUSATE SODIUM 2 TABLET: 50; 8.6 TABLET ORAL at 17:29

## 2024-04-30 RX ADMIN — FENTANYL CITRATE 100 MCG: 50 INJECTION, SOLUTION INTRAMUSCULAR; INTRAVENOUS at 05:41

## 2024-04-30 RX ADMIN — Medication 100 MCG/HR: at 00:14

## 2024-04-30 RX ADMIN — PROPOFOL 5.14 MCG/KG/MIN: 10 INJECTION, EMULSION INTRAVENOUS at 00:18

## 2024-04-30 RX ADMIN — FENTANYL CITRATE 100 MCG: 50 INJECTION, SOLUTION INTRAMUSCULAR; INTRAVENOUS at 00:04

## 2024-04-30 RX ADMIN — POTASSIUM CHLORIDE AND SODIUM CHLORIDE: 900; 300 INJECTION, SOLUTION INTRAVENOUS at 03:22

## 2024-04-30 RX ADMIN — SODIUM CHLORIDE, POTASSIUM CHLORIDE, SODIUM LACTATE AND CALCIUM CHLORIDE: 600; 310; 30; 20 INJECTION, SOLUTION INTRAVENOUS at 00:21

## 2024-04-30 RX ADMIN — MAGNESIUM SULFATE HEPTAHYDRATE 2 G: 2 INJECTION, SOLUTION INTRAVENOUS at 07:52

## 2024-04-30 RX ADMIN — AMPICILLIN AND SULBACTAM 3 G: 1; 2 INJECTION, POWDER, FOR SOLUTION INTRAMUSCULAR; INTRAVENOUS at 01:46

## 2024-04-30 RX ADMIN — SODIUM CHLORIDE: 9 INJECTION, SOLUTION INTRAVENOUS at 06:07

## 2024-04-30 RX ADMIN — ENOXAPARIN SODIUM 40 MG: 100 INJECTION SUBCUTANEOUS at 17:28

## 2024-04-30 RX ADMIN — FENTANYL CITRATE 100 MCG: 50 INJECTION, SOLUTION INTRAMUSCULAR; INTRAVENOUS at 00:43

## 2024-04-30 RX ADMIN — PROPOFOL 20 MCG/KG/MIN: 10 INJECTION, EMULSION INTRAVENOUS at 07:19

## 2024-04-30 RX ADMIN — AMPICILLIN AND SULBACTAM 3 G: 1; 2 INJECTION, POWDER, FOR SOLUTION INTRAMUSCULAR; INTRAVENOUS at 05:14

## 2024-04-30 RX ADMIN — INSULIN HUMAN 2 UNITS: 100 INJECTION, SOLUTION PARENTERAL at 17:29

## 2024-04-30 RX ADMIN — FENTANYL CITRATE 100 MCG: 50 INJECTION, SOLUTION INTRAMUSCULAR; INTRAVENOUS at 01:46

## 2024-04-30 ASSESSMENT — PAIN DESCRIPTION - PAIN TYPE
TYPE: ACUTE PAIN

## 2024-04-30 ASSESSMENT — COPD QUESTIONNAIRES
DURING THE PAST 4 WEEKS HOW MUCH DID YOU FEEL SHORT OF BREATH: NONE/LITTLE OF THE TIME
DO YOU EVER COUGH UP ANY MUCUS OR PHLEGM?: NO/ONLY WITH OCCASIONAL COLDS OR INFECTIONS
HAVE YOU SMOKED AT LEAST 100 CIGARETTES IN YOUR ENTIRE LIFE: NO/DON'T KNOW
COPD SCREENING SCORE: 1

## 2024-04-30 NOTE — RESPIRATORY CARE
Extubation    Cuff leak noted yes  Stridor present no     4L NC     Patient toleration well  RCP Complete? yes  Events/Summary/Plan: extubated patient (04/30/24 1209)

## 2024-04-30 NOTE — CARE PLAN
The patient is Watcher - Medium risk of patient condition declining or worsening    Shift Goals  Clinical Goals: Rass -1 to +1, mobilize, wean ventilator  Patient Goals: DOM  Family Goals: DOM    Progress made toward(s) clinical / shift goals:    Problem: Safety - Medical Restraint  Goal: Remains free of injury from restraints (Restraint for Interference with Medical Device)  Outcome: Progressing     Problem: Provide Safe Environment  Goal: Suicide environmental safety, protocols, policies, and practices will be implemented  Outcome: Progressing     Problem: Pain - Standard  Goal: Alleviation of pain or a reduction in pain to the patient’s comfort goal  Outcome: Progressing     Problem: Respiratory  Goal: Patient will achieve/maintain optimum respiratory ventilation and gas exchange  Outcome: Progressing       Patient is not progressing towards the following goals:

## 2024-04-30 NOTE — H&P
Critical Care History & Physical Note    Date of Service  4/29/2024    Primary Care Physician  No primary care provider on file.    Consultants  critical care    Code Status  Full Code    Chief Complaint  Chief Complaint   Patient presents with    Drug Overdose     BIB [facility] from home in [place].  found her in chair altered. Reports pt took a whole bottle of Seroquel, unknown dosage. GCS 7-8 on arrival. Unable to maintain airway. Pt desat, brunilda down to 50s, 100 ketamine and 80 carola for RSI @ 2035, 2 pushes of 500mcg epi for hypotension/bradycardia, 7.0 tube.        History of Presenting Illness  Ck Chicas (Kayleigh Lazo) is a 54 y.o. female with a past medical history significant for major depression with a history of suicidal ideation in the past, hypothyroidism, and type 2 diabetes who was found by her  on the evening of 4/29/2024 with altered mental status and a newly refilled empty bottle of Seroquel with 90 pills missing.  When EMS arrived they found the patient vomiting and clearly aspirating.  The patient was eventually intubated after 4 5 attempts with more evidence of aspiration.  She did become bradycardic and required 2 pushes of epinephrine, but no CPR.  The  reports that his wife has been depressed recently as their son who had battled brain cancer as a child was recently rediagnosed with brain cancer again.  No other history was obtained as the  was not at bedside the patient was intubated at the time of my evaluation.    I discussed the plan of care with bedside RN, charge RN, pharmacy, and RT.    Review of Systems  Review of Systems   Unable to perform ROS: Intubated       Past Medical History   has a past medical history of Diabetes (HCC), Hypertension, and Psychiatric disorder. Hypothyroidism, depression with a history of suicidal ideation    Surgical History  Unknown/unable to obtain as the patient is intubated    Family History  Unknown/unable to obtain as  the patient is intubated    Social History   Unknown/unable to obtain as the patient is intubated    Allergies  No Known Allergies    Medications  Prior to Admission Medications   Prescriptions Last Dose Informant Patient Reported? Taking?   QUEtiapine (SEROQUEL) 50 MG tablet unk at [facility] Home [facility] Yes Yes   Sig: Take 50 mg by mouth every day.   escitalopram (LEXAPRO) 10 MG Tab unk at [facility] Patient[facility] Home Pharm[facility] Yes Yes   Sig: Take 10 mg by mouth every day.   gabapentin (NEURONTIN) 100 MG Cap unk at [facility] Patient[facility] Home Pharm[facility] Yes Yes   Sig: Take 100 mg by mouth 3 times a day.   hydrOXYzine pamoate (VISTARIL) 25 MG Cap unk at [facility] Home Pharm[facility] Yes Yes   Sig: Take 25 mg by mouth every day.   levothyroxine (SYNTHROID) 150 MCG Tab unk at [facility] Pharm[facility] Yes Yes   Sig: Take 150 mcg by mouth every day.   metFORMIN (GLUCOPHAGE) 500 MG Tab unk at [facility] Pharm[facility] Yes Yes   Sig: Take 500 mg by mouth 2 times a day with meals.   rosuvastatin (CRESTOR) 20 MG Tab unk at [facility] Home Pharm[facility] Yes Yes   Sig: Take 20 mg by mouth every day.      Facility-Administered Medications: None       Physical Exam  Temp:  [32.6 °C (90.7 °F)-35 °C (95 °F)] 35 °C (95 °F)  Pulse:  [119-127] 125  Resp:  [20-26] 26  BP: (125-157)/(58-74) 125/58  SpO2:  [97 %-99 %] 98 %  Blood Pressure: 125/58   Temperature: (!) 35 °C (95 °F)   Pulse: (!) 125   Respiration: (!) 26   Pulse Oximetry: 98 %       Physical Exam  Vitals and nursing note reviewed.   Constitutional:       Appearance: She is obese. She is ill-appearing.   HENT:      Head: Normocephalic and atraumatic.      Right Ear: External ear normal.      Left Ear: External ear normal.      Nose: Nose normal. No rhinorrhea.      Mouth/Throat:      Mouth: Mucous membranes are moist.      Comments: ETT in place  Eyes:      General: No scleral icterus.     Conjunctiva/sclera: Conjunctivae normal.      Pupils: Pupils are equal, round, and reactive to  "light.   Cardiovascular:      Rate and Rhythm: Regular rhythm. Tachycardia present.      Heart sounds: No murmur heard.  Pulmonary:      Breath sounds: Normal breath sounds. No wheezing.      Comments: Breathing comfortably on the ventilator, clear  Chest:      Chest wall: No tenderness.   Abdominal:      Palpations: Abdomen is soft.      Tenderness: There is no abdominal tenderness. There is no guarding or rebound.   Genitourinary:     Comments: Kelley in place  Musculoskeletal:         General: Normal range of motion.      Cervical back: Normal range of motion and neck supple.      Right lower leg: No edema.      Left lower leg: No edema.   Lymphadenopathy:      Cervical: No cervical adenopathy.   Skin:     General: Skin is warm and dry.      Capillary Refill: Capillary refill takes less than 2 seconds.      Findings: No rash.   Neurological:      Cranial Nerves: No cranial nerve deficit.      Sensory: No sensory deficit.      Motor: No weakness.      Comments: No sedation, non-purposeful movements of all extremities, occasionally opens eyes, but not to voice, ?w/d to uppers with noxious stimuli, (+)gag/cough   Psychiatric:      Comments: Unable to obtain         Laboratory:  Recent Labs     04/29/24  2200   WBC 9.1   RBC 4.69   HEMOGLOBIN 13.7   HEMATOCRIT 41.7   MCV 88.9   MCH 29.2   MCHC 32.9   RDW 43.8   PLATELETCT 200   MPV 10.7     Recent Labs     04/29/24  2200   SODIUM 137   POTASSIUM 2.9*   CHLORIDE 100   CO2 20   GLUCOSE 290*   BUN 16   CREATININE 0.70   CALCIUM 8.4*     Recent Labs     04/29/24  2200   ALTSGPT 49   ASTSGOT 40   ALKPHOSPHAT 123*   TBILIRUBIN 0.4   GLUCOSE 290*         No results for input(s): \"NTPROBNP\" in the last 72 hours.      Recent Labs     04/29/24  2200   TROPONINT 36*       Imaging:  DX-CHEST-PORTABLE (1 VIEW)   Final Result         1.  Left basilar atelectasis and/or infiltrate.   2.  Trace left pleural effusion      DX-CHEST-PORTABLE (1 VIEW)    (Results Pending) "       Assessment/Plan:  Justification for Admission Status  I anticipate this patient will require at least two midnights for appropriate medical management, necessitating inpatient admission because of intentional overdose with seroquel requiring intubation for hypoxia and airway protection    Patient will need a ICU (Adult and Pediatrics) bed on MEDICAL service .  The need is secondary to will require at least two midnights for appropriate medical management, necessitating inpatient admission because of intentional overdose with seroquel requiring intubation for hypoxia and airway protection and ventilator management.    * Drug overdose, intentional self-harm, initial encounter (HCC)- (present on admission)  Assessment & Plan  Reportedly ingested 90 pills of seroquel  Poison control contacted:  monitor QTc and if > 500, give 1-2 gram Mg, protect airway  UDS pending  Will need psych eval and legal hold once extubated    Major depressive disorder- (present on admission)  Assessment & Plan  Hx of  Will resume home medications in next few days     Hypothyroidism- (present on admission)  Assessment & Plan  Will resume home synthroid in next 1-2 days  Check TSH    Type 2 diabetes mellitus with hyperglycemia (HCC)- (present on admission)  Assessment & Plan  BG goals 140-180s  Medium ISS  Hypoglycemia protocols     Aspiration into respiratory tract- (present on admission)  Assessment & Plan  After intentional drug OD  S/p bronch with BAL  Monitor CXR  Empiric Unasyn     Hypokalemia- (present on admission)  Assessment & Plan  Replete to goal > 4    Acute respiratory failure with hypoxia (HCC)- (present on admission)  Assessment & Plan  Due to drug OD with aspiration  Intubated in the field on 4/29  Cont full vent support  RT/O2 protocols  Vent bundle protocols  I am actively adjusting vent settings based on ABGs/vent mechanics  Cont lung protective vent settings  SAT/SBTs when clinically appropriate  S/p bronch with  BAL        VTE prophylaxis: SCDs/TEDs and enoxaparin ppx    The patient remains critically ill.  I have assessed and reassessed the respiratory status and made ventilator adjustments based upon arterial blood gas analysis, ventilator waveforms and airway mechanics.  I have assessed and reassessed the blood pressure, hemodynamics, cardiovascular status. This patient remains at high risk for worsening cardiopulmonary dysfunction and death without the above critical care interventions.    Critical care time = 110 minutes

## 2024-04-30 NOTE — ASSESSMENT & PLAN NOTE
Due to drug OD with aspiration  Intubated in the field on 4/29  Cont full vent support  RT/O2 protocols  Vent bundle protocols  I am actively adjusting vent settings based on ABGs/vent mechanics  Cont lung protective vent settings  SAT/SBTs when clinically appropriate  S/p bronch with BAL

## 2024-04-30 NOTE — CONSULTS
"PSYCHIATRIC INTAKE EVALUATION(new)  Reason for admission:   Chief Complaint   Patient presents with    Drug Overdose     BIB [facility] from home in [place].  found her in chair altered. Reports pt took a whole bottle of Seroquel, unknown dosage. GCS 7-8 on arrival. Unable to maintain airway. Pt desat, brunilda down to 50s, 100 ketamine and 80 carola for RSI @ 2035, 2 pushes of 500mcg epi for hypotension/bradycardia, 7.0 tube.        Reason for consult:\"SA OD\"  Requesting Provider:    Malachi Willett     Legal Hold Status:   on hold         Chart reviewed.         *HPI: Unable to obtain, patient is intubated         Medical ROS (as pertinent):     ROS unable to obtain, intubated      *Psychiatric Examination:  Vitals:   Vitals:    04/30/24 0900   BP: 98/56   Pulse: 64   Resp: (!) 24   Temp:    SpO2: 97%     Intubated    Past Medical History:   Past Medical History:   Diagnosis Date    Diabetes (HCC)     Hypertension     Psychiatric disorder         Past Psychiatric History:  Previous Diagnosis: Per chart history of major depressive disorder  Current meds: Per chart Seroquel  Suicide attempts/SIB: Per chart history of suicide ideations      Family Hx: Unable to obtain    Social Hx: .  Her son was really diagnosed with brain cancer recently    Substances: Unable to obtain      Current Medications:  Current Facility-Administered Medications   Medication Dose Route Frequency Provider Last Rate Last Admin    norepinephrine (Levophed) 8 mg in 250 mL NS infusion (premix)  0-1 mcg/kg/min (Ideal) Intravenous Continuous Lulu Winslow M.D.   Dose not Required at 04/30/24 0515    NS infusion   Intravenous Continuous Lluu Winslow M.D. 83 mL/hr at 04/30/24 0726 Rate Verify at 04/30/24 0726    acetaminophen (Tylenol) tablet 650 mg  650 mg Enteral Tube Q6HRS PRN Jose Ramon Verduzco D.O.        ondansetron (Zofran ODT) dispertab 4 mg  4 mg Enteral Tube Q4HRS PRN Jose Ramon Verduzco D.O.        promethazine (Phenergan) tablet " 12.5-25 mg  12.5-25 mg Enteral Tube Q4HRS PRN Jose Ramon Verduzco D.O.        magnesium sulfate IVPB premix 2 g  2 g Intravenous Once Jose Ramon Verduzco D.O. 25 mL/hr at 04/30/24 0752 2 g at 04/30/24 0752    Pharmacy Consult: Enteral tube insertion - review meds/change route/product selection   Other PHARMACY TO DOSE Malachi Willett        enoxaparin (Lovenox) inj 40 mg  40 mg Subcutaneous DAILY AT 1800 Cathy Wilcox M.D.   40 mg at 04/30/24 0028    ondansetron (Zofran) syringe/vial injection 4 mg  4 mg Intravenous Q4HRS PRN Cathy Wilcox M.D.        promethazine (Phenergan) suppository 12.5-25 mg  12.5-25 mg Rectal Q4HRS PRN Cathy Wilcox M.D.        prochlorperazine (Compazine) injection 5-10 mg  5-10 mg Intravenous Q4HRS PRN Cathy Wilcox M.D.        Respiratory Therapy Consult   Nebulization Continuous RT Cathy Wilcox M.D.        famotidine (Pepcid) tablet 20 mg  20 mg Enteral Tube Q12HRS Cathy Wilcox M.D.        Or    famotidine (Pepcid) injection 20 mg  20 mg Intravenous Q12HRS Cathy Wilcox M.D.   20 mg at 04/30/24 0514    senna-docusate (Pericolace Or Senokot S) 8.6-50 MG per tablet 2 Tablet  2 Tablet Enteral Tube BID Cathy Wilcox M.D.        And    polyethylene glycol/lytes (Miralax) Packet 1 Packet  1 Packet Enteral Tube QDAY PRN Cathy Wilcox M.D.        And    magnesium hydroxide (Milk Of Magnesia) suspension 30 mL  30 mL Enteral Tube QDAY PRN Cathy iWlcox M.D.        And    bisacodyl (Dulcolax) suppository 10 mg  10 mg Rectal QDAY PRN Cathy Wilcox M.D. MD Alert...ICU Electrolyte Replacement per Pharmacy   Other PHARMACY TO DOSE Cathy Wilcox M.D.        lidocaine (Xylocaine) 1 % injection 2 mL  2 mL Tracheal Tube Q30 MIN PRN Cathy Wilcox M.D.        fentaNYL (Sublimaze) injection 50 mcg  50 mcg Intravenous Q15 MIN PRN Cathy Wilcox M.D.   50 mcg at 04/29/24 4176    And    fentaNYL (Sublimaze) injection 100 mcg  100 mcg Intravenous Q15 MIN PRN  Cathy Wilcox M.D.   100 mcg at 24 0541    And    fentaNYL (SUBLIMAZE) 50 mcg/mL in 50mL (Continuous Infusion)   Intravenous Continuous Cathy Wilcox M.D. 1 mL/hr at 24 0712 50 mcg/hr at 24 0712    And    propofol (DIPRIVAN) injection  0-40 mcg/kg/min (Ideal) Intravenous Continuous Cathy Wilcox M.D. 5.5 mL/hr at 24 0739 15 mcg/kg/min at 24 0739    ipratropium-albuterol (DUONEB) nebulizer solution  3 mL Nebulization Q2HRS PRN (RT) Cathy Wilcox M.D.        insulin regular (HumuLIN R,NovoLIN R) injection  2-9 Units Subcutaneous Q6HRS Cathy Wilcox M.D.   3 Units at 24 0031    And    dextrose 50% (D50W) injection 25 g  25 g Intravenous Q15 MIN PRN Cathy Wilcox M.D.        ampicillin/sulbactam (Unasyn) 3 g in  mL IVPB  3 g Intravenous Q6HRS Cathy Wilcox M.D.   Stopped at 24 0544        Allergies:  Patient has no known allergies.       Labs personally reviewed:   Recent Results (from the past 72 hour(s))   EKG (NOW)    Collection Time: 24  9:56 PM   Result Value Ref Range    Report       [facility] Emergency Dept.    Test Date:  2024  Pt Name:    ANTOLIN EWING                Department: ER  MRN:        [id number]                      Room:       [facility]  Gender:     F                            Technician:  :        1970                   Requested By:TAINA DUGGAN  Order #:    [id number]                    Reading MD: Taina Duggan    Measurements  Intervals                                Axis  Rate:       122                          P:          60  WA:         142                          QRS:        -76  QRSD:       164                          T:          -74  QT:         340  QTc:        485    Interpretive Statements  Sinus tachycardia  Consider right atrial enlargement  Nonspecific IVCD with LAD  Inferior infarct, age indeterminate  Lateral infarct, acute  No previous ECG available for  comparison  Electronically Signed On 04- 22:12:55 PDT by Malachi Willett     POCT glucose device results    Collection Time: 04/29/24  9:58 PM   Result Value Ref Range    POC Glucose, Blood 261 (H) 65 - 99 mg/dL   CBC WITH DIFFERENTIAL    Collection Time: 04/29/24 10:00 PM   Result Value Ref Range    WBC 9.1 4.8 - 10.8 K/uL    RBC 4.69 4.20 - 5.40 M/uL    Hemoglobin 13.7 12.0 - 16.0 g/dL    Hematocrit 41.7 37.0 - 47.0 %    MCV 88.9 81.4 - 97.8 fL    MCH 29.2 27.0 - 33.0 pg    MCHC 32.9 32.2 - 35.5 g/dL    RDW 43.8 35.9 - 50.0 fL    Platelet Count 200 164 - 446 K/uL    MPV 10.7 9.0 - 12.9 fL    Neutrophils-Polys 78.70 (H) 44.00 - 72.00 %    Lymphocytes 13.00 (L) 22.00 - 41.00 %    Monocytes 5.40 0.00 - 13.40 %    Eosinophils 1.20 0.00 - 6.90 %    Basophils 0.60 0.00 - 1.80 %    Immature Granulocytes 1.10 (H) 0.00 - 0.90 %    Nucleated RBC 0.00 0.00 - 0.20 /100 WBC    Neutrophils (Absolute) 7.16 1.82 - 7.42 K/uL    Lymphs (Absolute) 1.18 1.00 - 4.80 K/uL    Monos (Absolute) 0.49 0.00 - 0.85 K/uL    Eos (Absolute) 0.11 0.00 - 0.51 K/uL    Baso (Absolute) 0.05 0.00 - 0.12 K/uL    Immature Granulocytes (abs) 0.10 0.00 - 0.11 K/uL    NRBC (Absolute) 0.00 K/uL   COMP METABOLIC PANEL    Collection Time: 04/29/24 10:00 PM   Result Value Ref Range    Sodium 137 135 - 145 mmol/L    Potassium 2.9 (L) 3.6 - 5.5 mmol/L    Chloride 100 96 - 112 mmol/L    Co2 20 20 - 33 mmol/L    Anion Gap 17.0 (H) 7.0 - 16.0    Glucose 290 (H) 65 - 99 mg/dL    Bun 16 8 - 22 mg/dL    Creatinine 0.70 0.50 - 1.40 mg/dL    Calcium 8.4 (L) 8.5 - 10.5 mg/dL    Correct Calcium 8.2 (L) 8.5 - 10.5 mg/dL    AST(SGOT) 40 12 - 45 U/L    ALT(SGPT) 49 2 - 50 U/L    Alkaline Phosphatase 123 (H) 30 - 99 U/L    Total Bilirubin 0.4 0.1 - 1.5 mg/dL    Albumin 4.3 3.2 - 4.9 g/dL    Total Protein 7.1 6.0 - 8.2 g/dL    Globulin 2.8 1.9 - 3.5 g/dL    A-G Ratio 1.5 g/dL   TROPONIN    Collection Time: 04/29/24 10:00 PM   Result Value Ref Range    Troponin T 36  (H) 6 - 19 ng/L   DIAGNOSTIC ALCOHOL    Collection Time: 04/29/24 10:00 PM   Result Value Ref Range    Diagnostic Alcohol <10.1 <10.1 mg/dL   Salicylate    Collection Time: 04/29/24 10:00 PM   Result Value Ref Range    Salicylates, Quant. <1.0 (L) 15.0 - 25.0 mg/dL   ACETAMINOPHEN    Collection Time: 04/29/24 10:00 PM   Result Value Ref Range    Acetaminophen -Tylenol <5.0 (L) 10.0 - 30.0 ug/mL   LACTIC ACID    Collection Time: 04/29/24 10:00 PM   Result Value Ref Range    Lactic Acid 2.4 (H) 0.5 - 2.0 mmol/L   ESTIMATED GFR    Collection Time: 04/29/24 10:00 PM   Result Value Ref Range    GFR (CKD-EPI) 101 >60 mL/min/1.73 m 2   MAGNESIUM    Collection Time: 04/29/24 10:00 PM   Result Value Ref Range    Magnesium 2.0 1.5 - 2.5 mg/dL   POCT arterial blood gas device results    Collection Time: 04/29/24 10:46 PM   Result Value Ref Range    Ph 7.185 (LL) 7.400 - 7.500    Pco2 61.3 (HH) 26.0 - 37.0 mmHg    Po2 142 (H) 64 - 87 mmHg    Tco2 25 20 - 33 mmol/L    S02 98 93 - 99 %    Hco3 23.1 17.0 - 25.0 mmol/L    BE -6 (L) -4 - 3 mmol/L    Body Temp 34.2 C degrees    O2 Therapy 100 %    iPF Ratio 142     Ph Temp Khadar 7.222 (LL) 7.400 - 7.500    Pco2 Temp Co 54.2 (HH) 26.0 - 37.0 mmHg    Po2 Temp Cor 126 (H) 64 - 87 mmHg    Specimen Arterial     Jair Test Pass     DelSys Vent     Tidal Volume 400 mL    Peep End Expiratory Pressure 8 cmh20    Set Rate 20     Mode APV-CMV    POCT lactate device results    Collection Time: 04/29/24 10:46 PM   Result Value Ref Range    iStat Lactate 1.7 0.5 - 2.0 mmol/L   URINE DRUG SCREEN    Collection Time: 04/29/24 10:53 PM   Result Value Ref Range    Amphetamines Urine Negative Negative    Barbiturates Negative Negative    Benzodiazepines Negative Negative    Cocaine Metabolite Negative Negative    Fentanyl, Urine Negative Negative    Methadone Negative Negative    Opiates Negative Negative    Oxycodone Negative Negative    Phencyclidine -Pcp Negative Negative    Propoxyphene Negative  Negative    Cannabinoid Metab Negative Negative   POCT glucose device results    Collection Time: 04/30/24 12:28 AM   Result Value Ref Range    POC Glucose, Blood 248 (H) 65 - 99 mg/dL   TSH WITH REFLEX TO FT4    Collection Time: 04/30/24 12:30 AM   Result Value Ref Range    TSH 3.470 0.380 - 5.330 uIU/mL   POCT arterial blood gas device results    Collection Time: 04/30/24  3:36 AM   Result Value Ref Range    Ph 7.291 (LL) 7.400 - 7.500    Pco2 40.4 (H) 26.0 - 37.0 mmHg    Po2 88 (H) 64 - 87 mmHg    Tco2 21 20 - 33 mmol/L    S02 96 93 - 99 %    Hco3 19.5 17.0 - 25.0 mmol/L    BE -7 (L) -4 - 3 mmol/L    Body Temp 35.7 C degrees    O2 Therapy 50 %    iPF Ratio 176     Ph Temp Khadar 7.309 (L) 7.400 - 7.500    Pco2 Temp Co 38.2 (H) 26.0 - 37.0 mmHg    Po2 Temp Cor 81 64 - 87 mmHg    Specimen Arterial     DelSys Vent     End Tidal Carbon Dioxide 36 mmhg    Tidal Volume 370 mL    Peep End Expiratory Pressure 8 cmh20    Set Rate 24     Mode APV-CMV    CBC with Differential    Collection Time: 04/30/24  4:51 AM   Result Value Ref Range    WBC 8.7 4.8 - 10.8 K/uL    RBC 3.83 (L) 4.20 - 5.40 M/uL    Hemoglobin 11.0 (L) 12.0 - 16.0 g/dL    Hematocrit 34.0 (L) 37.0 - 47.0 %    MCV 88.8 81.4 - 97.8 fL    MCH 28.7 27.0 - 33.0 pg    MCHC 32.4 32.2 - 35.5 g/dL    RDW 43.8 35.9 - 50.0 fL    Platelet Count 200 164 - 446 K/uL    MPV 10.9 9.0 - 12.9 fL    Neutrophils-Polys 88.50 (H) 44.00 - 72.00 %    Lymphocytes 7.40 (L) 22.00 - 41.00 %    Monocytes 3.50 0.00 - 13.40 %    Eosinophils 0.10 0.00 - 6.90 %    Basophils 0.20 0.00 - 1.80 %    Immature Granulocytes 0.30 0.00 - 0.90 %    Nucleated RBC 0.00 0.00 - 0.20 /100 WBC    Neutrophils (Absolute) 7.69 (H) 1.82 - 7.42 K/uL    Lymphs (Absolute) 0.64 (L) 1.00 - 4.80 K/uL    Monos (Absolute) 0.30 0.00 - 0.85 K/uL    Eos (Absolute) 0.01 0.00 - 0.51 K/uL    Baso (Absolute) 0.02 0.00 - 0.12 K/uL    Immature Granulocytes (abs) 0.03 0.00 - 0.11 K/uL    NRBC (Absolute) 0.00 K/uL   Basic Metabolic  Panel (BMP)    Collection Time: 24  4:51 AM   Result Value Ref Range    Sodium 138 135 - 145 mmol/L    Potassium 5.9 (H) 3.6 - 5.5 mmol/L    Chloride 109 96 - 112 mmol/L    Co2 19 (L) 20 - 33 mmol/L    Glucose 152 (H) 65 - 99 mg/dL    Bun 15 8 - 22 mg/dL    Creatinine 0.56 0.50 - 1.40 mg/dL    Calcium 7.5 (L) 8.5 - 10.5 mg/dL    Anion Gap 10.0 7.0 - 16.0   Magnesium    Collection Time: 24  4:51 AM   Result Value Ref Range    Magnesium 1.7 1.5 - 2.5 mg/dL   Phosphorus    Collection Time: 24  4:51 AM   Result Value Ref Range    Phosphorus 3.1 2.5 - 4.5 mg/dL   ESTIMATED GFR    Collection Time: 24  4:51 AM   Result Value Ref Range    GFR (CKD-EPI) 107 >60 mL/min/1.73 m 2   POCT glucose device results    Collection Time: 24  6:02 AM   Result Value Ref Range    POC Glucose, Blood 120 (H) 65 - 99 mg/dL   EKG    Collection Time: 24  9:06 AM   Result Value Ref Range    Report       [facility]    Test Date:  2024  Pt Name:    ANTOLIN EWING                Department: 161  MRN:        [id number]                      Room:       T618  Gender:     Female                       Technician: JACLYN  :        1970                   Requested By:SELENA VAUGHAN  Order #:    [id number]                    Reading MD:    Measurements  Intervals                                Axis  Rate:       63                           P:          37  FL:         191                          QRS:        5  QRSD:       89                           T:          4  QT:         439  QTc:        450    Interpretive Statements  Sinus rhythm  Compared to ECG 2024 21:56:43  Sinus tachycardia no longer present  Intraventricular conduction delay no longer present  Myocardial infarct finding no longer present       TSH within normal limit  UDS negative  BAL negative      EKG: QTc 450  Brain Imaging: Not done  EEG: Not done         Assessment: Patient was admitted today ICU, currently intubated s/p overdose  on a full bottle of Seroquel.  She has a history per chart of major depressive disorder, currently having a family stressor (son was recently diagnosed with brain cancer).  Patient at this time is intubated and unable to participate in evaluation.  Due to overdose, I am extending her legal hold as she continues to have an acute elevated risk of danger to herself at this time      Dx:  History of major depressive disorder    Medical:        Plan:  Legal hold: Extended  Psychotropic medications: Hold psychotropic medications at this time due to recent overdose on Seroquel  Please transfer pt to inpatient psychiatric hospital when medically cleared and bed is available  Psychiatry will follow up.     Thank you for the consult.     Sitter: One-to-one once she is extubated  Phone: Hospital phone to speak with family only, supervised  Visitors: Family only, supervised  Personal belongings: No    This note was created using voice recognition software (Dragon). The accuracy of the dictation is limited by the abilities of the software. I have reviewed the note prior to signing. However, error related to voice recognition software and /or scribes may still exist and should be interpreted within the appropriate context.

## 2024-04-30 NOTE — DISCHARGE PLANNING
Medical Social Work    Referral: Acute Medical Patient    Intervention: Pt is a 54 year old female brought in by [facility] from home for possible OD on seroquel after being found by her spouse.  Pt is Kayleigh Lazo (: 1970).  Per medics pt's spouse, Huey can be reached at: [phone].  SW attempted to contact pt's spouse and left a general voice message letting him know Kayleigh has arrived at [facility] and gave him ER SW number to call as needed.    Plan: SW will follow.

## 2024-04-30 NOTE — ED NOTES
Poison control Case number: [id number]   CATHERINE Mcintosh    After review of patients other prescribed mediations:  Recommend Q4 hr lactic acid and bmp x12 hours. Related to pt being prescribed metformin and that it is unknown if she might have taken this.      Regarding Seroquel: Monitor for CNS depression.   Monitor for QTc prolongation. If QTc is >500 give 1-2 gram of Mag and repeat EKG after mag bolus.     Check tylenol  Check Asprin level.

## 2024-04-30 NOTE — ED NOTES
[facility] 2 arrival @ 2150    54f @ home in [place].  found her in chair altered. Pt took a whole bottle of Seroquel, unk doseage. GCS 7-8 on arrival. Unable to maintain airway. Pt desat, brunilda down to 50s, 100 ketamine and 80 carola for RSI @ 2035, 2 pushes of 500mcg epi for hypotension/bradycardia, 7.0 tube. No known history or allergies. Per CF pt has been depressed recently r/t family issues.     Arrival: 20LAC    Roel () [phone]    FSBG 261. EKG completed and reviewed @ 2159. Restraints applied @ 2200. OG placed @ 2200. Xray called @ 2200. NS bolus @ 2201.

## 2024-04-30 NOTE — ED NOTES
Pt continue to have a core body temp below normal after blankets and hot packs. [employer] applied. MD villarreal

## 2024-04-30 NOTE — FLOWSHEET NOTE
24 2200   Events/Summary/Plan   Events/Summary/Plan pt arrived intubated from OSH   Skin Integrity Intact   Protective Device Foam Pads   Ventiliation   $ Ventilation - Initial Yes   Vent Clock Initiated Yes   General Vent Information   Vent Mode APVCMV   Vent Alarms   Ventilation Alarms Reviewed / Activated Yes   Upper Pressure Limit Alarm 45   Lower Pressure Limit 12   Low VE Alarm 4   High Respiratory Rate Alarm 40   Low Respiratory Rate Alarm 8   Low VT Alarm 150   Apnea Parameters Checked / Activated Yes   % Leak Alarm off   Vent Settings   FiO2% 100 %   Rate (breaths/min) 20   Vent Temp HME Yes   Vt Target (mL) 400   TI (Seconds) 0.9   PEEP/CPAP 8   Pramp 70   Trigger Type Flow Trigger   Sensitivity Setting 5   Vent Readings   PIP 22    Minute Volume 7.5   Control VTE (exp VT) 386   f Total (Breaths/Min) 20   I:E Ratio 1:2.3   MAP 13   PEEP/CPAP MONITORED 8.7   Static Compliance (ml / cm H2O) 41.3   R exp 0.48

## 2024-04-30 NOTE — ASSESSMENT & PLAN NOTE
Reportedly ingested 90 pills of seroquel  Poison control contacted:  monitor QTc and if > 500, give 1-2 gram Mg, protect airway  UDS pending  Will need psych eval and legal hold once extubated   Well Child Office Visit  Date: 3/8/2019  PCP: Kevin Ho DO    Ranjan Evans is a 8 month old male who presents for 9 month well child exam.  Patient presents with Mother.    PMH of maternal drug dependence, secondhand smoke exposure, ANISH with a 45-day NICU stay for withdrawal monitoring while weaning from methadone. Patient receives home nursing and PT to ensure normal development after ANISH. Also has positive HCV IgG, but HCV RNA is negative. Needs repeat IgG at 18 months.     Seen in clinic on 2/5/19 for well child. Referred to audiology again for hearing screen. Discussed eczema conservative measures. Accidental bloody nose, parental reassurance and review of red flag symptoms.     Concerns raised today include:      Hearing screen done at [facility] Done in the past week and he passed!    Feeding:   - Formula - 10 oz over 5 hours  - Cereal mixed in with formula  - Have started baby foods    Sleeping: No sleep or behavioral concerns.    Elimination:  Normal wet diapers and bowel movements.    SOCIAL:  Primary caretakers: Mom and Dad  :  No   Support at home:  Yes  Smoke exposure: outside home - mom and uncle  Pets: No     DEVELOPMENT:  sits without support, stands holding on, cruises, imitates speech sounds, responds to name and plays \"peek-a-bishop\"    No words yet. Does say \"Ma.\"     Trouble with the pincer grasp.     Birth history, medical history, surgical history, and family history reviewed and updated.    PHYSICAL EXAM:  Visit Vitals  Pulse 112   Temp 99.1 °F (37.3 °C) (Tympanic)   Resp 36   Ht 29.25\" (74.3 cm)   Wt 8.8 kg   HC 46 cm (18.11\")   BMI 15.94 kg/m²      GEN:  Well appearing male, nontoxic, no acute distress.  Alert and interactive.  SKIN: Warm, normal turgor.  No cyanosis.  No bruises or lesions. Dry skin on back.   HEAD:  Normocephalic, atraumatic.  Anterior fontannel open, soft and flat.  EYES:  Conjunctiva appear normal, non-injected, non-icteric.  NOSE:  Appears normal, no  flaring.  EARS:  Normal pinnae, no preauricular skin tags or pit.  TMs are erythematous with normal landmarks.  THROAT:  Oropharynx with moist mucus membranes and no lesions.   NECK:  Supple, no masses. Mild posterior cervical lymphadenopathy, <1 cm.  HEART:  Regular rate and rhythm.  Normal S1, S2, without murmurs, rubs, gallops.   LUNGS:  Normal work of breathing without retractions.  Clear to auscultation bilaterally.  No wheezes, rales, rhonchi.   ABD:  Soft, nontender.  No organomegaly or masses.  :  Ernesto 1 male and Testes descended bilaterally.   MSK:  Hips within normal range of motion.  Negative Barry, Ortolani.  Spine straight.    EXT:  Warm, dry, without abnormalities.  Leg length equal.  NEURO:  Normal tone, bulk, strength.    ASSESSMENT:  8 month old male well infant with PMH of maternal drug dependence, secondhand smoke exposure, HCV exposure, ANISH with a 45-day NICU stay for withdrawal monitoring while weaning from methadone.       PLAN:  (Z00.121) Encounter for routine child health examination with abnormal findings  (primary encounter diagnosis): All parental concerns and questions discussed. Anticipatory guidance provided, handout given.              Development              Advancing solids              Accident prevention: childproof home, water safety, avoid baby walkers              Name and vocalize objects              Phase out bottle              Analgesices/antipyretics              Sun exposure              Tobacco-free home              Dental care              Lead exposure risk: No               Vitamin D supplementation: not needed                (Z23) Need for influenza vaccination: Immunizations per orders.  Risks, benefits, and side effects discussed.  Plan: INFLUENZA QUADRIVALENT SPLIT PRES FREE 0.25 ML VACC, IM (FLUZONE 6-35 MONTHS)    (Z01.10) Needs hearing screen at 6 months due to being in NICU - passed on 2/28/19    (Z20.5) Exposure to hepatitis C needs Igg at 2, 6, and 18  mos: Has positive HCV IgG, but HCV RNA is negative. Needs repeat IgG at 18 months.     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     FOLLOW UP:  In 3 months for 12 month WCC or sooner if needed.    This patient was seen by and discussed with attending physician, Dr. Carolin Romero, who agrees with the above assessment and plan.    Omar William MD  PGY-3 Family Medicine  3/8/2019

## 2024-04-30 NOTE — PROGRESS NOTES
4 Eyes Skin Assessment Completed by Trey RN and CATHERINE Garsia.    Head WDL  Ears Redness and Blanching  Nose WDL  Mouth WDL  Neck WDL  Breast/Chest WDL  Shoulder Blades WDL  Spine WDL  (R) Arm/Elbow/Hand WDL  (L) Arm/Elbow/Hand WDL  Abdomen WDL  Groin WDL  Scrotum/Coccyx/Buttocks Redness and Blanching  (R) Leg WDL  (L) Leg WDL  (R) Heel/Foot/Toe Redness and Blanching  (L) Heel/Foot/Toe Redness and Blanching          Devices In Places Blood Pressure Cuff, Pulse Ox, Kelley, SCD's, ET Tube, and OG/NG      Interventions In Place TAP System, Pillows, Q2 Turns, Low Air Loss Mattress, and Heels Loaded W/Pillows    Possible Skin Injury No    Pictures Uploaded Into Epic N/A  Wound Consult Placed N/A  RN Wound Prevention Protocol Ordered No

## 2024-04-30 NOTE — DISCHARGE PLANNING
Samantha Wagner [phone] from  the [facility] Department called to check in on Pt. Today, stating she was part of the crew that sent her to the ED lastnight and she  had some questions. CATALINA transferred call to the CICU .

## 2024-04-30 NOTE — PROGRESS NOTES
4 Eyes Skin Assessment Completed by CATHERINE Littlejohn and CATHERINE Henderson    Skin assessment is primarily focused on high risk bony prominences. Pay special attention to skin beneath and around medical devices, high risk bony prominences, skin to skin areas and areas where the patient lacks sensation to feel pain and areas where the patient previously had breakdown.     HIGH RISK PRESSURE POINTS -    Occipital Region WDL  Right Ear Red and Blanching  Left Ear Red and Blanching  Sacrum/Coccyx WDL  Right ischial tuberosity (Sit Bones) WDL  Left Ischial Tuberosity (Sit Bones) WDL  Right Heel Red and Blanching  Left Heel Red and Blanching    Skin Risk/Kip   Sensory Perception: Very Limited  Moisture: Occasionally Moist  Activity: Bedfast  Mobility: Very Limited  Nutrition: Probably Inadequate  Friction and Shear: Potential Problem  Total Score: 12  Skin Breakdown Risk: 10-12 High Risk for Skin Breakdown    Sensory Interventions  Bed Types: ICU Low Airloss  Skin Preventative Measures: Pillows in Use to Float Heels, Pillows in Use for Support / Positioning, Wedges in Use for Positioning  Moisturizers/Barriers: Containment Devices     Activity : Range of motion  Patient Turns / Repositioning: Sitting Up in Chair  Patient is Receiving Nutrition: Nothing by Mouth     Vitamin Therapy in Use: No  Friction Interventions: Draw Sheet / Pad Used for Repositioning, Sacral Foam Dressing in Use                                  PROTOCOL INTERVENTIONS:   ICU Low Airloss Bed Already in place  TAPs Glide sheet Already in place  Eklley Already in place    WOUND PHOTOS:   N/A no wounds identified    WOUND CONSULT:   N/A, no advanced wound care needs identified

## 2024-04-30 NOTE — ED PROVIDER NOTES
ED Provider Note    Scribed for Malachi Willett by Argentina Mata. 4/29/2024  9:52 PM    Primary care provider: None noted  Means of arrival: Care flight  History obtained from: Patient  History limited by: None    CHIEF COMPLAINT  Chief Complaint   Patient presents with    Drug Overdose     BIB [facility] from home in [place].  found her in chair altered. Reports pt took a whole bottle of Seroquel, unknown dosage. GCS 7-8 on arrival. Unable to maintain airway. Pt desat, brunilda down to 50s, 100 ketamine and 80 carola for RSI @ 2035, 2 pushes of 500mcg epi for hypotension/bradycardia, 7.0 tube.      EXTERNAL RECORDS REVIEWED  Other No out patient records to review    HPI/ROS  LIMITATION TO HISTORY   Select: Patient intubated prior to arrival  OUTSIDE HISTORIAN(S):  Care flight provides helpful collateral information    HPI  Ck Chicas is a 54 y.o female, with a history of depression, who presents to the Emergency Department via Care flight to bedside following a Seroquel overdose. Per Care flight, the patient's  found her altered, sitting up in her chair when he returned home. The bottle of her recently filled Seroquel was empty next to her. It is unsure the exact count of Seroquel that was taken. EMS showed up at approximately 8 PM. When care flight arrived, the patient was GCS 7, and she was covered in vomit. She was not able to protect her airway en route, and was intubated due to pulse ox in the 50s. The patient became bradycardic and hypotensive en route, and 2 500 mcg push of epi were given, as well as 1L fluid. This helped maintain her vitals en route. Care flight notes that her son was recently re-diagnosed with Brain cancer.    REVIEW OF SYSTEMS  As above, all other systems reviewed and are negative.   See HPI for further details.     PAST MEDICAL HISTORY   has a past medical history of Diabetes (HCC), Hypertension, and Psychiatric disorder.None noted  SURGICAL HISTORY  patient  denies any surgical history  SOCIAL HISTORY      Social History     Substance and Sexual Activity   Drug Use None noted     FAMILY HISTORY  None noted  CURRENT MEDICATIONS  No current outpatient medications    ALLERGIES  None noted    PHYSICAL EXAM    VITAL SIGNS:   Vitals:    04/29/24 2215 04/29/24 2223 04/29/24 2230 04/29/24 2233   BP:  136/63  130/60   Pulse:  (!) 120 (!) 120 (!) 125   Resp:  20 20 (!) 24   Temp: (!) 35 °C (95 °F)      TempSrc: Axillary      SpO2:  98% 99% 99%   Weight:       Height:         Vitals: My interpretation: normotensive, tachycardic, hypothermic, not hypoxic    Reinterpretation of vitals: Improving    Cardiac Monitor Interpretation: The cardiac monitor revealed normal Sinus Rhythm with tachycardia as interpreted by me. The cardiac monitor was ordered secondary to the patient's history of tachycardia and to monitor for dysrhythmia and/or tachycardia.    PE:   Gen: GCS 3, Intubated and sedated, hypothermic  ENT: intubated. Mucous membranes moist   Neck: Supple, FROM  Pulmonary: Lungs are clear to auscultation bilaterally. No tachypnea  CV:  tachycardic rate. RR, no murmur appreciated, pulses 2+ in both upper and lower extremities  Abdomen: soft, NT/ND; no rebound/guarding  Neuro: GCS 3, Intubated and sedated  Skin: No rash or lesions.  No pallor or jaundice.  No cyanosis.  Warm and dry.     DIAGNOSTIC STUDIES / PROCEDURES    LABS  Results for orders placed or performed during the hospital encounter of 04/29/24   CBC WITH DIFFERENTIAL   Result Value Ref Range    WBC 9.1 4.8 - 10.8 K/uL    RBC 4.69 4.20 - 5.40 M/uL    Hemoglobin 13.7 12.0 - 16.0 g/dL    Hematocrit 41.7 37.0 - 47.0 %    MCV 88.9 81.4 - 97.8 fL    MCH 29.2 27.0 - 33.0 pg    MCHC 32.9 32.2 - 35.5 g/dL    RDW 43.8 35.9 - 50.0 fL    Platelet Count 200 164 - 446 K/uL    MPV 10.7 9.0 - 12.9 fL    Neutrophils-Polys 78.70 (H) 44.00 - 72.00 %    Lymphocytes 13.00 (L) 22.00 - 41.00 %    Monocytes 5.40 0.00 - 13.40 %    Eosinophils  1.20 0.00 - 6.90 %    Basophils 0.60 0.00 - 1.80 %    Immature Granulocytes 1.10 (H) 0.00 - 0.90 %    Nucleated RBC 0.00 0.00 - 0.20 /100 WBC    Neutrophils (Absolute) 7.16 1.82 - 7.42 K/uL    Lymphs (Absolute) 1.18 1.00 - 4.80 K/uL    Monos (Absolute) 0.49 0.00 - 0.85 K/uL    Eos (Absolute) 0.11 0.00 - 0.51 K/uL    Baso (Absolute) 0.05 0.00 - 0.12 K/uL    Immature Granulocytes (abs) 0.10 0.00 - 0.11 K/uL    NRBC (Absolute) 0.00 K/uL   COMP METABOLIC PANEL   Result Value Ref Range    Sodium 137 135 - 145 mmol/L    Potassium 2.9 (L) 3.6 - 5.5 mmol/L    Chloride 100 96 - 112 mmol/L    Co2 20 20 - 33 mmol/L    Anion Gap 17.0 (H) 7.0 - 16.0    Glucose 290 (H) 65 - 99 mg/dL    Bun 16 8 - 22 mg/dL    Creatinine 0.70 0.50 - 1.40 mg/dL    Calcium 8.4 (L) 8.5 - 10.5 mg/dL    Correct Calcium 8.2 (L) 8.5 - 10.5 mg/dL    AST(SGOT) 40 12 - 45 U/L    ALT(SGPT) 49 2 - 50 U/L    Alkaline Phosphatase 123 (H) 30 - 99 U/L    Total Bilirubin 0.4 0.1 - 1.5 mg/dL    Albumin 4.3 3.2 - 4.9 g/dL    Total Protein 7.1 6.0 - 8.2 g/dL    Globulin 2.8 1.9 - 3.5 g/dL    A-G Ratio 1.5 g/dL   TROPONIN   Result Value Ref Range    Troponin T 36 (H) 6 - 19 ng/L   DIAGNOSTIC ALCOHOL   Result Value Ref Range    Diagnostic Alcohol <10.1 <10.1 mg/dL   Salicylate   Result Value Ref Range    Salicylates, Quant. <1.0 (L) 15.0 - 25.0 mg/dL   ACETAMINOPHEN   Result Value Ref Range    Acetaminophen -Tylenol <5.0 (L) 10.0 - 30.0 ug/mL   LACTIC ACID   Result Value Ref Range    Lactic Acid 2.4 (H) 0.5 - 2.0 mmol/L   ESTIMATED GFR   Result Value Ref Range    GFR (CKD-EPI) 101 >60 mL/min/1.73 m 2   EKG (NOW)   Result Value Ref Range    Report       [facility] Emergency Dept.    Test Date:  2024  Pt Name:    ANTOLIN EWING                Department: ER  MRN:        [id number]                      Room:       [facility]  Gender:     F                            Technician:  :        [id number]                   Requested By:TAINA CANALES  OLGA LIDIA  Order #:    [id number]                    Reading MD: Malachi Willett    Measurements  Intervals                                Axis  Rate:       122                          P:          60  AL:         142                          QRS:        -76  QRSD:       164                          T:          -74  QT:         340  QTc:        485    Interpretive Statements  Sinus tachycardia  Consider right atrial enlargement  Nonspecific IVCD with LAD  Inferior infarct, age indeterminate  Lateral infarct, acute  No previous ECG available for comparison  Electronically Signed On 04- 22:12:55 PDT by Malachi Willett     POCT glucose device results   Result Value Ref Range    POC Glucose, Blood 261 (H) 65 - 99 mg/dL   POCT arterial blood gas device results   Result Value Ref Range    Ph 7.185 (LL) 7.400 - 7.500    Pco2 61.3 (HH) 26.0 - 37.0 mmHg    Po2 142 (H) 64 - 87 mmHg    Tco2 25 20 - 33 mmol/L    S02 98 93 - 99 %    Hco3 23.1 17.0 - 25.0 mmol/L    BE -6 (L) -4 - 3 mmol/L    Body Temp 34.2 C degrees    O2 Therapy 100 %    iPF Ratio 142     Ph Temp Khadar 7.222 (LL) 7.400 - 7.500    Pco2 Temp Co 54.2 (HH) 26.0 - 37.0 mmHg    Po2 Temp Cor 126 (H) 64 - 87 mmHg    Specimen Arterial     Jair Test Pass     DelSys Vent     Tidal Volume 400 mL    Peep End Expiratory Pressure 8 cmh20    Set Rate 20     Mode APV-CMV    POCT lactate device results   Result Value Ref Range    iStat Lactate 1.7 0.5 - 2.0 mmol/L      All labs reviewed by me. Labs were compared to prior labs if they were available. Significant for no leukocytosis, no anemia, electrolytes show a moderate hypokalemia 2.9 otherwise normal electrolytes, moderate hyperglycemia 290, normal liver enzymes, normal bilirubin, troponin minimally elevated at 36, alcohol level negative, lactic acidosis of 2.4, pH shows metabolic acidosis.  Salicylate and acetaminophen levels negative, alcohol level negative.    RADIOLOGY  I have independently interpreted the  diagnostic imaging associated with this visit and am waiting the final reading from the radiologist.   My preliminary interpretation is a follows: Endotracheal tube at the level of the clavicles. OG tube that appears in the gastric fundus. No signs of focal consolidation or aspiration. Likely moderate cardiomegaly.  Radiologist interpretation is as follows:  DX-CHEST-PORTABLE (1 VIEW)   Final Result         1.  Left basilar atelectasis and/or infiltrate.   2.  Trace left pleural effusion        COURSE & MEDICAL DECISION MAKING  Nursing notes, VS, PMSFHx, labs, imaging, EKG reviewed in chart.    Heart Score: Moderate     Hydration: Based on the patient's presentation of Hypotension and Inability to take oral fluids the patient was given IV fluids. IV Hydration was used because oral hydration was not adequate alone. Upon recheck following hydration, the patient was mildly improved.    Ddx: Drug overdose, suicidal ideation, depression    MDM: 9:52 PM Ck Chicas is a 89 y.o. adult who presented with overdose.  Patient reportedly has a son that was rediagnosed with brain cancer has been depressed recently.  She is on Seroquel and had a bottle that was recently filled, approximately 90 pills, and was found empty next to her.   found her altered and called EMS, there is reports of possible vomiting and aspiration and patient was intubated.  Apparently she was a difficult intubation requiring multiple attempts desatted, almost went into an arrest but was bagged back up and ultimately intubated with a 7 oh tube and transported here.  EMS provides helpful collateral information.  They report that EKG was normal in the field and I reviewed it at bedside.  Blood glucose was over 200 in the field.  Upon arrival here patient is intubated, no sedation running, completely unresponsive, GCS of 3T.  Transported to bed and EMS gave report.  Lung auscultation shows bilateral breath sounds and suspect appropriate tube  placement at this time.  Her vital signs show tachycardia but thankfully she is hemodynamically stable.  Poison control called, see nursing note for case number.  EKG done shows sinus rhythm with a normal QTc thankfully.  IV fluids started for tachycardia.  OG tube placed by nursing staff, Kelley placed by nursing staff.  X-ray my depend interpretation is unremarkable for full consolidation or signs of aspiration, radiologist says there is some mild left basilar atelectasis, cannot rule out infiltrate but was switched at this time.  Patient was discussed with pharmacy and at this time supportive care is recommended as it is poison control's recommendation.  Patient still not showing any signs of intent or purposeful action.  All labs reviewed by me. Labs were compared to prior labs if they were available. Significant for no leukocytosis, no anemia, electrolytes show a moderate hypokalemia 2.9 otherwise normal electrolytes, moderate hyperglycemia 290, normal liver enzymes, normal bilirubin, troponin minimally elevated at 36, alcohol level negative, lactic acidosis of 2.4, pH shows metabolic acidosis.  Salicylate, acetaminophen, alcohol levels negative.  Discussed with ICU physician and will plan to admit to the ICU.  Patient remains critically ill but stabilized currently thankfully.    11:05 PM I discussed the patient's case and the above findings with Dr. Wilcox (ICU) who agrees to hospitalize the patient and take over the plan of care moving forward.     CRITICAL CARE TIME 38 minutes: Acute critically ill patient, intubated, requiring ICU placement, frequent reevaluations and consultation with specialist.  There was a very real possibility of deterioration of the patient's condition.  This patient required the highest level of care.  I provided critical care services which included: review of the medical record, treatment orders, ordering and reviewing test results, frequent reevaluation of the patient's  condition and response to treatment, as well as discussing the case with appropriate personnel and various consultants. The critical care time associated with the care of this patient is exclusive of any procedures or specific interventions.     ADDITIONAL PROBLEM LIST AND DISPOSITION    I have discussed management of the patient with the following physicians and GABBI's:  hospitalist and intensivist     Discussion of management with other Q or appropriate source(s): Pharmacy at bedside, RT at bedside, and Social Work at bedside      Barriers to care at this time, including but not limited to:  Patient not from [place] .     Decision tools and prescription drugs considered including, but not limited to:  None .    FINAL IMPRESSION  1. Intentional drug overdose, initial encounter ([facility]) Acute   2. Endotracheally intubated Acute   3. Greyson coma scale total score 3-8, in the field (EMT or ambulance) ([facility]) Acute      Argentina WILCOX (Kristy), am scribing for, and in the presence of, Malachi Willett.    Electronically signed by: Argentina Mata (Kristy), 4/29/2024    IMalachi personally performed the services described in this documentation, as scribed by Argentina Mata in my presence, and it is both accurate and complete.    The note accurately reflects work and decisions made by me.  Malachi Willett  4/29/2024  11:04 PM

## 2024-04-30 NOTE — ED NOTES
Patient unable to participate in interview.    Med rec partially completed.      ([facility] [phone]) unable to specify names/ strengths of medications.     Med list partially provided by [facility] ([phone]).    Patients other pharmacy ([facility] [phone]) currently closed, re-opens 4/30/24 0900.

## 2024-04-30 NOTE — ED TRIAGE NOTES
Endive Eighty  89 y.o. female  Chief Complaint   Patient presents with    Drug Overdose     BIB [facility] from home in [place].  found her in chair altered. Reports pt took a whole bottle of Seroquel, unknown dosage. GCS 7-8 on arrival. Unable to maintain airway. Pt desat, brunilda down to 50s, 100 ketamine and 80 carola for RSI @ 2035, 2 pushes of 500mcg epi for hypotension/bradycardia, 7.0 tube.      Pt to red 09, ERP at bedside.     Vitals:    04/29/24 2200   Pulse: 123   Resp: 20   SpO2: 97%

## 2024-04-30 NOTE — PROGRESS NOTES
Daily Progress Note:     Date of Service: 4/30/2024  Primary Team: [facility] ICU Gold Team   Attending: Jose Ramon Verduzco D.O.   Senior Resident: Jennifer Richard M.D.      Chief Complaint:   Drug overdose, found in chair altered, reported to have taken 90 pills of Seroquel    Hospital Course:  Ck Chicas (Kayleigh Lazo) is a 54 year old female with a medical history including major depression with a history of suicidal ideation in the past, hypothyroidism and type 2 diabetes. She was found by  on 4/29 in altered mental status with an empty bottle of seroquel with 90 pills missing. EMS found patient vomiting and aspirating when they arrived. Patient was intubated after 4-5 attempts. Patient became bradycardic and required two pushes of epinephrine. No CPR was needed. Patient has had increased depression as a result of son's cancer return. History has been obtained via chart review.     Interval History:  Patient continues to be intubated. She does respond to voice command. Currently she has started following commands. We have been following QT intervals as per poison control.     Consultants/Specialty:  Psychiatry    Review of Systems:    Review of Systems   Unable to perform ROS: Patient unresponsive   Currently sedated and intubated.     Objective:   Physical Exam:   Vitals:   Temp:  [32.6 °C (90.7 °F)-37 °C (98.6 °F)] 36.8 °C (98.2 °F)  Pulse:  [] 66  Resp:  [13-36] 24  BP: ()/(50-74) 122/59  SpO2:  [95 %-100 %] 99 %    Physical Exam  Constitutional:       Appearance: Normal appearance.      Interventions: She is sedated and intubated.      Comments: Opens eyes to voice, has started to follow commands   HENT:      Head: Normocephalic and atraumatic.   Eyes:      Extraocular Movements: Extraocular movements intact.      Pupils: Pupils are equal, round, and reactive to light.   Cardiovascular:      Rate and Rhythm: Normal rate and regular rhythm.      Pulses: Normal pulses.      Heart sounds: Normal heart  sounds. No murmur heard.     No gallop.   Pulmonary:      Effort: Pulmonary effort is normal. No respiratory distress. She is intubated.      Breath sounds: Normal breath sounds. No wheezing or rales.   Abdominal:      General: Abdomen is flat. Bowel sounds are normal. There is no distension.      Palpations: Abdomen is soft.      Tenderness: There is no abdominal tenderness.   Musculoskeletal:         General: Normal range of motion.      Right lower leg: No edema.      Left lower leg: No edema.   Skin:     General: Skin is warm and dry.           Labs:   Recent Labs     04/29/24 2200 04/30/24  0451   WBC 9.1 8.7   RBC 4.69 3.83*   HEMOGLOBIN 13.7 11.0*   HEMATOCRIT 41.7 34.0*   MCV 88.9 88.8   MCH 29.2 28.7   RDW 43.8 43.8   PLATELETCT 200 200   MPV 10.7 10.9   NEUTSPOLYS 78.70* 88.50*   LYMPHOCYTES 13.00* 7.40*   MONOCYTES 5.40 3.50   EOSINOPHILS 1.20 0.10   BASOPHILS 0.60 0.20     Recent Labs     04/29/24 2200 04/30/24  0451 04/30/24  1009   SODIUM 137 138 139   POTASSIUM 2.9* 5.9* 4.0   CHLORIDE 100 109 108   CO2 20 19* 21   GLUCOSE 290* 152* 115*   BUN 16 15 14     Recent Labs     04/29/24 2200 04/30/24  0451 04/30/24  1009   ALBUMIN 4.3  --   --    TBILIRUBIN 0.4  --   --    ALKPHOSPHAT 123*  --   --    TOTPROTEIN 7.1  --   --    ALTSGPT 49  --   --    ASTSGOT 40  --   --    CREATININE 0.70 0.56 0.45*       Imaging:   DX-ABDOMEN FOR TUBE PLACEMENT   Final Result         1.  Nonspecific bowel gas pattern in the upper abdomen.   2.  Nasogastric tube tip terminates overlying the expected location of the gastric antrum.   3.  Left basilar atelectasis or subtle infiltrates      DX-CHEST-PORTABLE (1 VIEW)   Final Result         1.  Left infrahilar infiltrate, decreased since prior study.   2.  Cardiomegaly      DX-CHEST-PORTABLE (1 VIEW)   Final Result         1.  Left basilar atelectasis and/or infiltrate.   2.  Trace left pleural effusion          Assessment and Plan:    * Drug overdose, intentional self-harm,  initial encounter (HCC)- (present on admission)  Assessment & Plan  Reportedly ingested 90 pills of seroquel  Poison control contacted:  monitor QTc and if > 500, give 1-2 gram Mg, protect airway. Mg goal 2. UDS negative. Psychiatry following, currently on legal hold.       Major depressive disorder- (present on admission)  Assessment & Plan  Hx of  Will resume home medications in next few days, will have psychiatry discuss with patient about her current medications.     Hypothyroidism- (present on admission)  Assessment & Plan  TSH 3.47, within normal limits. Will resume home synthroid tomorrow.       Type 2 diabetes mellitus with hyperglycemia (HCC)- (present on admission)  Assessment & Plan  BG goals 140-180s  Medium ISS  Hypoglycemia protocols     Aspiration into respiratory tract- (present on admission)  Assessment & Plan  After intentional drug OD  S/p bronch with BAL  Monitor CXR  Empiric Unasyn     Hypokalemia- (present on admission)  Assessment & Plan  Replete to goal > 4; potassium was given in fluids last K 5.9.  Repeated 4.0.     Acute respiratory failure with hypoxia (HCC)- (present on admission)  Assessment & Plan  Due to drug OD with aspiration. Will stop antibiotics because procalcitonin is within normal limits.   Intubated in the field on 4/29, Extubated 4/30  Cont full vent support  RT/O2 protocols  Vent bundle protocols  Cont lung protective vent settings  SAT/SBTs when clinically appropriate  S/p bronch with BAL    DVT: Lovenox  Antibiotics: N/A    Jennifer Richard MD, PGY3  Internal Medicine Resident

## 2024-04-30 NOTE — PROCEDURES
Procedure Note    Date: 4/30/2024  Time: 0130    Procedure: Bronchoscopy with bronchoalveolar lavage and therapeutic aspiration of secretions from bronchi    Indication: aspiration after drug overdose    Consent: Informed consent obtained from patient or designated decision maker after explaining the benefits/risks of the procedure including but not limited to bleeding, infection, airway trauma or loss therof, pneumothorax/hemothorax, arrythmia, or death. Patient or surrogate expressed understanding and agreement.    Time-out: Verbal consent was obtained. Immediately prior to procedure, a time out was called to verify the correct patient, procedure, equipment, support staff and site/side marked as required. Pre-procedure pain reported as 0/10 and post-procedure was 0/10.    Procedure: After obtaining consent, a time-out was performed. Respiratory therapy and nursing at bedside throughout procedure. Patient provided sedation and analgesia throughout the procedure. Placed on full ventilator support with an FiO2 of 100% throughout the procedure. Using a fiberoptic bronchoscope, trachea entered via 7.0 ETT.  0 mL of local anesthetic sprayed at the bart (2% lidocaine) achieving appropriate comfort level for patient. Airways visualized directly and the following intervention was performed: diagnostic and therapeutic lavage with all areas of lungs suctioned to clear. Findings as below. Patient tolerated procedure well without any difficulties and left in care of bedside nurse/RT.     Medications: Propofol infusion at 40    Findings:   Upper airway - Not visualized as bronchoscope passed through ETT.  Trachea to bart - normal appearing mucosa without lesions or mass, ETT tip measured 3-4 cm from the bart.    Right mainstem, RUL, RML, RLL and distal airways - mildly edematous appearing mucosa without mass/lesion/anatomic variance, secretions: mild white secretions that were lavaged and suctioned to clear    Left  mainstem, BREANA, lingula, LLL and distal airways - friable and mildly edematous appearing mucosa without mass/lesion/anatomic variance, secretions: scant white secretions that were lavaged and suctioned to clear          Samples - multiple aliquots of saline used for lavage of LLL.  Samples collected and sent for micro analysis    Complications: none  CXR (if applicable): n/a    Cathy Wilcox MD  Pulmonary and Critical Care Medicine

## 2024-04-30 NOTE — CARE PLAN
Ventilator Daily Summary    Vent Day #2  Airway: 7.0 @23    Ventilator settings: 24/370/8/50%  Weaning trials: no   Respiratory Procedures: no    Plan: Continue current ventilator settings and wean mechanical ventilation as tolerated per physician orders.  Problem: Ventilation  Goal: Ability to achieve and maintain unassisted ventilation or tolerate decreased levels of ventilator support  Description: Target End Date:  4 days     Document on Vent flowsheet    1.  Support and monitor invasive and noninvasive mechanical ventilation  2.  Monitor ventilator weaning response  3.  Perform ventilator associated pneumonia prevention interventions  4.  Manage ventilation therapy by monitoring diagnostic test results  Outcome: Progressing

## 2024-04-30 NOTE — CARE PLAN
The patient is Watcher - Medium risk of patient condition declining or worsening    Shift Goals  Clinical Goals: Stable hemodynamics, Rass between -1 to +1  Patient Goals: DOM    Progress made toward(s) clinical / shift goals:    Problem: Safety - Medical Restraint  Goal: Remains free of injury from restraints (Restraint for Interference with Medical Device)  Outcome: Progressing  Goal: Free from restraint(s) (Restraint for Interference with Medical Device)  Outcome: Progressing     Problem: Pain - Standard  Goal: Alleviation of pain or a reduction in pain to the patient’s comfort goal  Outcome: Progressing     Problem: Skin Integrity  Goal: Skin integrity is maintained or improved  Outcome: Progressing       Patient is not progressing towards the following goals:

## 2024-04-30 NOTE — ED NOTES
PT repeatedly kicking bear hugger off. When asked if she was too hot she nodded yes. Bear hugger removed.

## 2024-05-01 LAB
ANION GAP SERPL CALC-SCNC: 9 MMOL/L (ref 7–16)
BASOPHILS # BLD AUTO: 0.1 % (ref 0–1.8)
BASOPHILS # BLD: 0.01 K/UL (ref 0–0.12)
BUN SERPL-MCNC: 10 MG/DL (ref 8–22)
CALCIUM SERPL-MCNC: 8 MG/DL (ref 8.5–10.5)
CHLORIDE SERPL-SCNC: 104 MMOL/L (ref 96–112)
CO2 SERPL-SCNC: 24 MMOL/L (ref 20–33)
CREAT SERPL-MCNC: 0.57 MG/DL (ref 0.5–1.4)
EOSINOPHIL # BLD AUTO: 0.12 K/UL (ref 0–0.51)
EOSINOPHIL NFR BLD: 1.7 % (ref 0–6.9)
ERYTHROCYTE [DISTWIDTH] IN BLOOD BY AUTOMATED COUNT: 45.7 FL (ref 35.9–50)
FLUAV RNA SPEC QL NAA+PROBE: NEGATIVE
FLUBV RNA SPEC QL NAA+PROBE: NEGATIVE
GFR SERPLBLD CREATININE-BSD FMLA CKD-EPI: 106 ML/MIN/1.73 M 2
GLUCOSE BLD STRIP.AUTO-MCNC: 124 MG/DL (ref 65–99)
GLUCOSE BLD STRIP.AUTO-MCNC: 125 MG/DL (ref 65–99)
GLUCOSE BLD STRIP.AUTO-MCNC: 136 MG/DL (ref 65–99)
GLUCOSE BLD STRIP.AUTO-MCNC: 143 MG/DL (ref 65–99)
GLUCOSE BLD STRIP.AUTO-MCNC: 153 MG/DL (ref 65–99)
GLUCOSE SERPL-MCNC: 136 MG/DL (ref 65–99)
HCT VFR BLD AUTO: 34.6 % (ref 37–47)
HGB BLD-MCNC: 11.5 G/DL (ref 12–16)
IMM GRANULOCYTES # BLD AUTO: 0.03 K/UL (ref 0–0.11)
IMM GRANULOCYTES NFR BLD AUTO: 0.4 % (ref 0–0.9)
LYMPHOCYTES # BLD AUTO: 1.01 K/UL (ref 1–4.8)
LYMPHOCYTES NFR BLD: 14.6 % (ref 22–41)
MAGNESIUM SERPL-MCNC: 2.1 MG/DL (ref 1.5–2.5)
MCH RBC QN AUTO: 29.3 PG (ref 27–33)
MCHC RBC AUTO-ENTMCNC: 33.2 G/DL (ref 32.2–35.5)
MCV RBC AUTO: 88 FL (ref 81.4–97.8)
MONOCYTES # BLD AUTO: 0.54 K/UL (ref 0–0.85)
MONOCYTES NFR BLD AUTO: 7.8 % (ref 0–13.4)
NEUTROPHILS # BLD AUTO: 5.21 K/UL (ref 1.82–7.42)
NEUTROPHILS NFR BLD: 75.4 % (ref 44–72)
NRBC # BLD AUTO: 0 K/UL
NRBC BLD-RTO: 0 /100 WBC (ref 0–0.2)
PHOSPHATE SERPL-MCNC: 3 MG/DL (ref 2.5–4.5)
PLATELET # BLD AUTO: 170 K/UL (ref 164–446)
PMV BLD AUTO: 10.5 FL (ref 9–12.9)
POTASSIUM SERPL-SCNC: 3.9 MMOL/L (ref 3.6–5.5)
RBC # BLD AUTO: 3.93 M/UL (ref 4.2–5.4)
RSV RNA SPEC QL NAA+PROBE: NEGATIVE
SARS-COV-2 RNA RESP QL NAA+PROBE: NOTDETECTED
SODIUM SERPL-SCNC: 137 MMOL/L (ref 135–145)
SPECIMEN SOURCE: NORMAL
WBC # BLD AUTO: 6.9 K/UL (ref 4.8–10.8)

## 2024-05-01 PROCEDURE — 99232 SBSQ HOSP IP/OBS MODERATE 35: CPT | Mod: GC | Performed by: PSYCHIATRY & NEUROLOGY

## 2024-05-01 PROCEDURE — 99223 1ST HOSP IP/OBS HIGH 75: CPT | Performed by: HOSPITALIST

## 2024-05-01 RX ORDER — POTASSIUM CHLORIDE 20 MEQ/1
40 TABLET, EXTENDED RELEASE ORAL ONCE
Status: COMPLETED | OUTPATIENT
Start: 2024-05-01 | End: 2024-05-01

## 2024-05-01 RX ORDER — POLYETHYLENE GLYCOL 3350 17 G/17G
1 POWDER, FOR SOLUTION ORAL
Status: DISCONTINUED | OUTPATIENT
Start: 2024-05-01 | End: 2024-05-03 | Stop reason: HOSPADM

## 2024-05-01 RX ORDER — LEVOTHYROXINE SODIUM 0.07 MG/1
150 TABLET ORAL DAILY
Status: DISCONTINUED | OUTPATIENT
Start: 2024-05-01 | End: 2024-05-03 | Stop reason: HOSPADM

## 2024-05-01 RX ORDER — GABAPENTIN 100 MG/1
100 CAPSULE ORAL 3 TIMES DAILY
Status: DISCONTINUED | OUTPATIENT
Start: 2024-05-01 | End: 2024-05-03 | Stop reason: HOSPADM

## 2024-05-01 RX ORDER — PROMETHAZINE HYDROCHLORIDE 25 MG/1
12.5-25 TABLET ORAL EVERY 4 HOURS PRN
Status: DISCONTINUED | OUTPATIENT
Start: 2024-05-01 | End: 2024-05-03 | Stop reason: HOSPADM

## 2024-05-01 RX ORDER — ONDANSETRON 4 MG/1
4 TABLET, ORALLY DISINTEGRATING ORAL EVERY 4 HOURS PRN
Status: DISCONTINUED | OUTPATIENT
Start: 2024-05-01 | End: 2024-05-03 | Stop reason: HOSPADM

## 2024-05-01 RX ORDER — ACETAMINOPHEN 325 MG/1
650 TABLET ORAL EVERY 6 HOURS PRN
Status: DISCONTINUED | OUTPATIENT
Start: 2024-05-01 | End: 2024-05-03 | Stop reason: HOSPADM

## 2024-05-01 RX ORDER — AMOXICILLIN 250 MG
2 CAPSULE ORAL 2 TIMES DAILY
Status: DISCONTINUED | OUTPATIENT
Start: 2024-05-01 | End: 2024-05-03 | Stop reason: HOSPADM

## 2024-05-01 RX ORDER — BISACODYL 10 MG
10 SUPPOSITORY, RECTAL RECTAL
Status: DISCONTINUED | OUTPATIENT
Start: 2024-05-01 | End: 2024-05-03 | Stop reason: HOSPADM

## 2024-05-01 RX ADMIN — SENNOSIDES AND DOCUSATE SODIUM 2 TABLET: 50; 8.6 TABLET ORAL at 05:58

## 2024-05-01 RX ADMIN — LEVOTHYROXINE SODIUM 150 MCG: 0.15 TABLET ORAL at 14:03

## 2024-05-01 RX ADMIN — GABAPENTIN 100 MG: 100 CAPSULE ORAL at 14:03

## 2024-05-01 RX ADMIN — POTASSIUM CHLORIDE 40 MEQ: 1500 TABLET, EXTENDED RELEASE ORAL at 08:59

## 2024-05-01 RX ADMIN — ACETAMINOPHEN 650 MG: 325 TABLET, FILM COATED ORAL at 03:00

## 2024-05-01 RX ADMIN — SENNOSIDES AND DOCUSATE SODIUM 2 TABLET: 50; 8.6 TABLET ORAL at 17:14

## 2024-05-01 RX ADMIN — ENOXAPARIN SODIUM 40 MG: 100 INJECTION SUBCUTANEOUS at 17:14

## 2024-05-01 RX ADMIN — GABAPENTIN 100 MG: 100 CAPSULE ORAL at 20:02

## 2024-05-01 ASSESSMENT — LIFESTYLE VARIABLES
ALCOHOL_USE: YES
AVERAGE NUMBER OF DAYS PER WEEK YOU HAVE A DRINK CONTAINING ALCOHOL: 0
HAVE PEOPLE ANNOYED YOU BY CRITICIZING YOUR DRINKING: NO
EVER FELT BAD OR GUILTY ABOUT YOUR DRINKING: NO
HAVE YOU EVER FELT YOU SHOULD CUT DOWN ON YOUR DRINKING: NO
TOTAL SCORE: 0
EVER HAD A DRINK FIRST THING IN THE MORNING TO STEADY YOUR NERVES TO GET RID OF A HANGOVER: NO
CONSUMPTION TOTAL: NEGATIVE
DOES PATIENT WANT TO STOP DRINKING: NO
HOW MANY TIMES IN THE PAST YEAR HAVE YOU HAD 5 OR MORE DRINKS IN A DAY: 0
ON A TYPICAL DAY WHEN YOU DRINK ALCOHOL HOW MANY DRINKS DO YOU HAVE: 0
TOTAL SCORE: 0
TOTAL SCORE: 0

## 2024-05-01 ASSESSMENT — COGNITIVE AND FUNCTIONAL STATUS - GENERAL
DAILY ACTIVITIY SCORE: 24
MOBILITY SCORE: 23
DAILY ACTIVITIY SCORE: 24
CLIMB 3 TO 5 STEPS WITH RAILING: A LITTLE
SUGGESTED CMS G CODE MODIFIER MOBILITY: CH
MOBILITY SCORE: 24
SUGGESTED CMS G CODE MODIFIER DAILY ACTIVITY: CH
SUGGESTED CMS G CODE MODIFIER MOBILITY: CI
SUGGESTED CMS G CODE MODIFIER DAILY ACTIVITY: CH

## 2024-05-01 ASSESSMENT — PAIN DESCRIPTION - PAIN TYPE
TYPE: ACUTE PAIN

## 2024-05-01 ASSESSMENT — ENCOUNTER SYMPTOMS: DEPRESSION: 1

## 2024-05-01 ASSESSMENT — FIBROSIS 4 INDEX: FIB4 SCORE: 1.88
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
## 2024-05-01 NOTE — PROGRESS NOTES
4 Eyes Skin Assessment Completed by Eliceo RN and CATHERINE Straks.    Head WDL  Ears Redness and Blanching  Nose WDL  Mouth WDL  Neck WDL  Breast/Chest WDL  Shoulder Blades Redness and Blanching  Spine Redness and Blanching  (R) Arm/Elbow/Hand Redness and Blanching  (L) Arm/Elbow/Hand Redness and Blanching  Abdomen WDL  Groin WDL  Scrotum/Coccyx/Buttocks Redness and Blanching  (R) Leg WDL  (L) Leg WDL  (R) Heel/Foot/Toe Redness and Blanching  (L) Heel/Foot/Toe Redness and Blanching          Devices In Places ECG, Blood Pressure Cuff, Pulse Ox, and Kelley, Nasal Canula/CPAP      Interventions In Place Gray Ear Foams, TAP System, Pillows, and Low Air Loss Mattress, Patient is ambulatory and turns self in bed    Possible Skin Injury No    Pictures Uploaded Into Epic N/A  Wound Consult Placed N/A  RN Wound Prevention Protocol Ordered No

## 2024-05-01 NOTE — CARE PLAN
The patient is Watcher - Medium risk of patient condition declining or worsening    Shift Goals  Clinical Goals: Hemodynamics  Patient Goals: Rest  Family Goals: DOM    Progress made toward(s) clinical / shift goals:    Problem: Provide Safe Environment  Goal: Suicide environmental safety, protocols, policies, and practices will be implemented  Description: Target End Date:  resolve day 1    1.  Remove objects or personal belongings that may cause harm or injury to self or others  2.  Dietary tray modifications (paperware)  3.  Provide a safe environment  4.  Render close patient supervision by sustaining observation or awareness of the patient at all times  Outcome: Progressing  Flowsheets (Taken 4/30/2024 2131)  Safety Interventions:   Medically necessary equipment present, hourly room safety check, and post-meal tray check.   Notify Receiving Department of Close Observation Status   Patient Accompanied by Unit Staff when off Unit.   Discussed no self harm or elopement and safe behavior with patient   Provided Safety Education   Plastic Utensils / Paper Ware Ordered   Potentially Dangerous Items Removed from room   Personal Clothing / Belongings Removed (Comment: Storage Location)   Patient Wearing Hospital Clothing   Patient Room Close to Nursing Station  Note: 1:1 sitter in place     Problem: Pain - Standard  Goal: Alleviation of pain or a reduction in pain to the patient’s comfort goal  Description: Target End Date:  Prior to discharge or change in level of care    Document on Vitals flowsheet    1.  Document pain using the appropriate pain scale per order or unit policy  2.  Educate and implement non-pharmacologic comfort measures (i.e. relaxation, distraction, massage, cold/heat therapy, etc.)  3.  Pain management medications as ordered  4.  Reassess pain after pain med administration per policy  5.  If opiods administered assess patient's response to pain medication is appropriate per POSS sedation scale  6.   Follow pain management plan developed in collaboration with patient and interdisciplinary team (including palliative care or pain specialists if applicable)  Outcome: Progressing  Note: Patient denies any pain. Pain is frequently assessed.      Problem: Hemodynamics  Goal: Patient's hemodynamics, fluid balance and neurologic status will be stable or improve  Description: Target End Date:  Prior to discharge or change in level of care    Document on Assessment and I/O flowsheet templates    1.  Monitor vital signs, pulse oximetry and cardiac monitor per provider order and/or policy  2.  Maintain blood pressure per provider order  3.  Hemodynamic monitoring per provider order  4.  Manage IV fluids and IV infusions  5.  Monitor intake and output  6.  Daily weights per unit policy or provider order  7.  Assess peripheral pulses and capillary refill  8.  Assess color and body temperature  9.  Position patient for maximum circulation/cardiac output  10. Monitor for signs/symptoms of excessive bleeding  11. Assess mental status, restlessness and changes in level of consciousness  12. Monitor temperature and report fever or hypothermia to provider immediately. Consideration of targeted temperature management.  Outcome: Progressing  Note: Patient is SR and MAP >65. No pressors. Good uop, warm extremities with palpable pulses
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
## 2024-05-01 NOTE — PROGRESS NOTES
Patient on legal hold with 1:1 sitter. Room safety checklist completed and report given to sitter.

## 2024-05-01 NOTE — PROGRESS NOTES
MD Winslow of patient's Temp 100.8, despite PO acetaminophen administration 1 hour prior. MD to evaluate patient.

## 2024-05-01 NOTE — PROGRESS NOTES
Brief intensivist note    Admitted for seroquel overdose, suicidal  Intubated. Qtc 450  Supportive treatment  Extubated yesterday, doing well.   Tolerating oral  Hemodynamically stable.     Plan:   Psych has been consulted and will see today   Continue daily ECG to monitor Qtc  Supportive treatment.   Sitter    Can be transferred to telemetry floor  D/w Hosp Medicine    Jose Ramon Verduzco D.O.

## 2024-05-01 NOTE — ASSESSMENT & PLAN NOTE
Purposeful Seroquel overdose requiring intubation and ICU admission  She remains at risk of QT interval prolongation  Check an EKG in the morning to evaluate QTc and consider starting medications if it is not prolonged  She remains on legal hold  Psychiatry consulted  If her QTc is appropriate she is likely to be medically optimized on 5/2/2024 for inpatient psychiatric hospitalization

## 2024-05-01 NOTE — PROGRESS NOTES
"Room Safety Checklist for Behavioral Health Patient completed.  ROBIN Cook, sitting 1:1 at bedside at start of shift.  - Pt searched and all belongings removed from the pt: verified previously completed  - Pt belongings have been labeled, itemized and safely stored in designated pt belonging area:  previously completed  - Activate suicide risk nursing protocol: verified previously completed  - Pt's clothes, shoes/laces, and jewelry removed and pt is dressed in a hospital gown.  If pt is wearing a bra, it is also removed.  Valuables sent to [employer].  Belongs stored appropriately based on location of pt: previously completed  - \"STOP - Please see RN\" sign in place outside pt room: verified   - Unnecessary IV poles are moved from the room: verified   - Unnecessary medical equipment and cords removed from the room (walkers, commodes, pulse oximetry, call light): verified   - Telephone and telephone cords removed from the room.  Any calls are supervised by either the sitter or the RN: verified and completed as applicable  - Beds are made with only flat sheets or blankets -- no fitted sheets: verified and completed as applicable  - Disposable utensils are counted before meal tray enters the room: completed as applicable  - Disposable utensils are counted after meal tray is removed the room: completed as applicable  - Any extra items are removed from closets: verified previously completed  - Glove boxes are removed from cages and placed outside the room: verified previously completed  - No extra bed linen is stored in the room: verified previously completed  - Any easily movable furniture is removed form the room: verified previously completed  - Ensure no items are left behind by ancillary services (EVS, PT, Lab): completed as applicable  - If pt has been to another area (e.g. Imaging, surgery), pt is searched upon return to unit to ensure they did not acquire any unauthorized items along the way: completed as " applicable  - Privacy curtain or bottom of privacy curtain is removed: verified previously completed  - Only one trash can liner in place in each trash can (not multiple layers): verified and completed as applicable

## 2024-05-01 NOTE — ASSESSMENT & PLAN NOTE
She is on metformin 500 mg twice a day as an outpatient  With significant hyperglycemia on admission as her glucose was 290 in the emergency room given the stress response  Diabetic diet and sliding scale insulin for now.  metformin

## 2024-05-01 NOTE — PROGRESS NOTES
Late entry:     Significant increase in urine output. Dr. Madsen notified. [facility] ordered for 1800.
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
## 2024-05-01 NOTE — CONSULTS
Hospital Medicine Consultation    Date of Service  5/1/2024    Referring Physician  Jose Ramon Verduzco D.O.    Consulting Physician  Trevin Mujica M.D.    Reason for Consultation  Suicide attempt    History of Presenting Illness  54 y.o. female who presented 4/29/2024 with purposeful drug overdose.  Mrs. Kayleigh Lazo is a 54-year-old with past medical history of type 2 diabetes mellitus, depression, hypothyroidism, dyslipidemia that was brought to the hospital on 4/29/2024 after her  found her altered at home after reportedly taking an entire bottle of Seroquel.  She was hypoxic with bradycardia and was intubated prior to arrival due to airway protection.  She required 2 pushes of epinephrine due to hypotension. In the emergency room, she had an anion gap of 17 glucose 290 with negative acetaminophen and negative salicylic acid level and QTc of 485.  Her blood gas read initially was a pH of 7.18 with a pCO2 of 61 and pO2 of 142.  Poison control was called.  She was admitted in guarded condition on mechanical ventilation to the intensive care unit.  She was subsequently extubated.  After extubation she has made it very clear that she has no intention of living and that she plans on suicide when she gets the opportunity.  A legal hold was initiated and psychiatry has been consulted.      Review of Systems  Review of Systems   Psychiatric/Behavioral:  Positive for depression and suicidal ideas.        Past Medical History   has a past medical history of Diabetes (HCC), Hypertension, and Psychiatric disorder.  Dyslipidemia hypothyroidism    Surgical History   has no past surgical history on file.    Family History  family history is not on file.    Social History   Lives with her .  She has 2 children that live in [place].    Medications  Prior to Admission Medications   Prescriptions Last Dose Informant Patient Reported? Taking?   QUEtiapine (SEROQUEL) 50 MG tablet unk at [facility] Patient's Home Pharmacy Yes  Yes   Sig: Take 50 mg by mouth every day.   escitalopram (LEXAPRO) 10 MG Tab unk at [facility] Patient's Home Pharmacy Yes Yes   Sig: Take 10 mg by mouth every day.   gabapentin (NEURONTIN) 100 MG Cap unk at [facility] Patient's Home Pharmacy Yes Yes   Sig: Take 100 mg by mouth 3 times a day.   hydrOXYzine pamoate (VISTARIL) 25 MG Cap unk at [facility] Patient's Home Pharmacy Yes Yes   Sig: Take 25 mg by mouth every day.   levothyroxine (SYNTHROID) 150 MCG Tab unk at [facility] Patient's Home Pharmacy Yes Yes   Sig: Take 150 mcg by mouth every day.   metFORMIN (GLUCOPHAGE) 500 MG Tab unk at [facility] Patient's Home Pharmacy Yes Yes   Sig: Take 500 mg by mouth 2 times a day with meals.   rosuvastatin (CRESTOR) 20 MG Tab unk at [facility] Patient's Home Pharmacy Yes Yes   Sig: Take 20 mg by mouth every day.      Facility-Administered Medications: None       Allergies  No Known Allergies    Physical Exam  Temp:  [36.8 °C (98.2 °F)-37.7 °C (99.9 °F)] 37.7 °C (99.9 °F)  Pulse:  [58-95] 69  Resp:  [14-24] 21  BP: (113-154)/(56-71) 154/71  SpO2:  [88 %-100 %] 90 %    Physical Exam  Vitals and nursing note reviewed.   Constitutional:       General: She is not in acute distress.  Cardiovascular:      Rate and Rhythm: Normal rate and regular rhythm.      Heart sounds: No murmur heard.  Pulmonary:      Effort: Pulmonary effort is normal.      Breath sounds: Normal breath sounds.   Abdominal:      General: There is no distension.      Tenderness: There is no abdominal tenderness.   Musculoskeletal:      Right lower leg: No edema.      Left lower leg: No edema.   Neurological:      General: No focal deficit present.      Mental Status: She is alert and oriented to person, place, and time.   Psychiatric:      Comments: Flat affect and mood         Fluids      Laboratory  Recent Labs     04/29/24  2200 04/30/24  0451 05/01/24  0440   WBC 9.1 8.7 6.9   RBC 4.69 3.83* 3.93*   HEMOGLOBIN 13.7 11.0* 11.5*   HEMATOCRIT 41.7 34.0* 34.6*   MCV 88.9 88.8 88.0   MCH 29.2 28.7  29.3   MCHC 32.9 32.4 33.2   RDW 43.8 43.8 45.7   PLATELETCT 200 200 170   MPV 10.7 10.9 10.5     Recent Labs     04/30/24  1009 04/30/24  1716 05/01/24  0440   SODIUM 139 140 137   POTASSIUM 4.0 3.7 3.9   CHLORIDE 108 106 104   CO2 21 22 24   GLUCOSE 115* 166* 136*   BUN 14 11 10   CREATININE 0.45* 0.68 0.57   CALCIUM 7.8* 8.0* 8.0*                     Imaging  DX-CHEST-PORTABLE (1 VIEW)   Final Result         1.  No acute cardiopulmonary disease.      DX-ABDOMEN FOR TUBE PLACEMENT   Final Result         1.  Nonspecific bowel gas pattern in the upper abdomen.   2.  Nasogastric tube tip terminates overlying the expected location of the gastric antrum.   3.  Left basilar atelectasis or subtle infiltrates      DX-CHEST-PORTABLE (1 VIEW)   Final Result         1.  Left infrahilar infiltrate, decreased since prior study.   2.  Cardiomegaly      DX-CHEST-PORTABLE (1 VIEW)   Final Result         1.  Left basilar atelectasis and/or infiltrate.   2.  Trace left pleural effusion          Assessment/Plan  * Drug overdose, intentional self-harm, initial encounter (HCC)- (present on admission)  Assessment & Plan  Purposeful Seroquel overdose requiring intubation and ICU admission  She remains at risk of QT interval prolongation  Check an EKG in the morning to evaluate QTc and consider starting medications if it is not prolonged  She remains on legal hold  Psychiatry consulted  If her QTc is appropriate she is likely to be medically optimized on 5/2/2024 for inpatient psychiatric hospitalization    Major depressive disorder- (present on admission)  Assessment & Plan  Hold on home meds  Psychiatry consulted  Check QTc in the morning and consider restarting    Hypothyroidism- (present on admission)  Assessment & Plan  TSH is 3.4  Restart home Synthroid 150 mcg daily    Type 2 diabetes mellitus with hyperglycemia (HCC)- (present on admission)  Assessment & Plan  She is on metformin 500 mg twice a day as an outpatient  With  significant hyperglycemia on admission as her glucose was 290 in the emergency room given the stress response  Diabetic diet and sliding scale insulin for now.  Restart metformin as an outpatient.    Hypokalemia- (present on admission)  Assessment & Plan  Potassium on admission was 2.9 and has been given and is now up to 3.9.    Acute respiratory failure with hypoxia (HCC)- (present on admission)  Assessment & Plan  Secondary to purposeful overdose requiring intubation and mechanical ventilation  Now resolved

## 2024-05-02 VITALS
BODY MASS INDEX: 34.1 KG/M2 | OXYGEN SATURATION: 93 % | RESPIRATION RATE: 16 BRPM | DIASTOLIC BLOOD PRESSURE: 75 MMHG | HEIGHT: 71 IN | WEIGHT: 243.61 LBS | TEMPERATURE: 97.5 F | SYSTOLIC BLOOD PRESSURE: 143 MMHG | HEART RATE: 71 BPM

## 2024-05-02 LAB
ANION GAP SERPL CALC-SCNC: 12 MMOL/L (ref 7–16)
BACTERIA SPEC RESP CULT: NORMAL
BASOPHILS # BLD AUTO: 0.6 % (ref 0–1.8)
BASOPHILS # BLD: 0.03 K/UL (ref 0–0.12)
BUN SERPL-MCNC: 11 MG/DL (ref 8–22)
CALCIUM SERPL-MCNC: 8.8 MG/DL (ref 8.5–10.5)
CHLORIDE SERPL-SCNC: 102 MMOL/L (ref 96–112)
CO2 SERPL-SCNC: 24 MMOL/L (ref 20–33)
CREAT SERPL-MCNC: 0.51 MG/DL (ref 0.5–1.4)
EKG IMPRESSION: NORMAL
EOSINOPHIL # BLD AUTO: 0.25 K/UL (ref 0–0.51)
EOSINOPHIL NFR BLD: 4.9 % (ref 0–6.9)
ERYTHROCYTE [DISTWIDTH] IN BLOOD BY AUTOMATED COUNT: 45 FL (ref 35.9–50)
GFR SERPLBLD CREATININE-BSD FMLA CKD-EPI: 109 ML/MIN/1.73 M 2
GLUCOSE BLD STRIP.AUTO-MCNC: 145 MG/DL (ref 65–99)
GLUCOSE BLD STRIP.AUTO-MCNC: 156 MG/DL (ref 65–99)
GLUCOSE BLD STRIP.AUTO-MCNC: 178 MG/DL (ref 65–99)
GLUCOSE BLD STRIP.AUTO-MCNC: 187 MG/DL (ref 65–99)
GLUCOSE SERPL-MCNC: 130 MG/DL (ref 65–99)
GRAM STN SPEC: NORMAL
HCT VFR BLD AUTO: 38 % (ref 37–47)
HGB BLD-MCNC: 12.3 G/DL (ref 12–16)
IMM GRANULOCYTES # BLD AUTO: 0.02 K/UL (ref 0–0.11)
IMM GRANULOCYTES NFR BLD AUTO: 0.4 % (ref 0–0.9)
LYMPHOCYTES # BLD AUTO: 1.43 K/UL (ref 1–4.8)
LYMPHOCYTES NFR BLD: 28.1 % (ref 22–41)
MAGNESIUM SERPL-MCNC: 2 MG/DL (ref 1.5–2.5)
MCH RBC QN AUTO: 28.8 PG (ref 27–33)
MCHC RBC AUTO-ENTMCNC: 32.4 G/DL (ref 32.2–35.5)
MCV RBC AUTO: 89 FL (ref 81.4–97.8)
MONOCYTES # BLD AUTO: 0.38 K/UL (ref 0–0.85)
MONOCYTES NFR BLD AUTO: 7.5 % (ref 0–13.4)
NEUTROPHILS # BLD AUTO: 2.98 K/UL (ref 1.82–7.42)
NEUTROPHILS NFR BLD: 58.5 % (ref 44–72)
NRBC # BLD AUTO: 0 K/UL
NRBC BLD-RTO: 0 /100 WBC (ref 0–0.2)
PHOSPHATE SERPL-MCNC: 4.2 MG/DL (ref 2.5–4.5)
PLATELET # BLD AUTO: 211 K/UL (ref 164–446)
PMV BLD AUTO: 10.5 FL (ref 9–12.9)
POTASSIUM SERPL-SCNC: 4.1 MMOL/L (ref 3.6–5.5)
RBC # BLD AUTO: 4.27 M/UL (ref 4.2–5.4)
SIGNIFICANT IND 70042: NORMAL
SITE SITE: NORMAL
SODIUM SERPL-SCNC: 138 MMOL/L (ref 135–145)
SOURCE SOURCE: NORMAL
WBC # BLD AUTO: 5.1 K/UL (ref 4.8–10.8)

## 2024-05-02 PROCEDURE — 99239 HOSP IP/OBS DSCHRG MGMT >30: CPT | Performed by: HOSPITALIST

## 2024-05-02 PROCEDURE — 93010 ELECTROCARDIOGRAM REPORT: CPT | Performed by: INTERNAL MEDICINE

## 2024-05-02 RX ADMIN — GABAPENTIN 100 MG: 100 CAPSULE ORAL at 20:13

## 2024-05-02 RX ADMIN — INSULIN HUMAN 2 UNITS: 100 INJECTION, SOLUTION PARENTERAL at 17:20

## 2024-05-02 RX ADMIN — METFORMIN HYDROCHLORIDE 500 MG: 500 TABLET ORAL at 17:17

## 2024-05-02 RX ADMIN — GABAPENTIN 100 MG: 100 CAPSULE ORAL at 08:02

## 2024-05-02 RX ADMIN — LEVOTHYROXINE SODIUM 150 MCG: 0.15 TABLET ORAL at 05:18

## 2024-05-02 RX ADMIN — GABAPENTIN 100 MG: 100 CAPSULE ORAL at 14:30

## 2024-05-02 RX ADMIN — INSULIN HUMAN 2 UNITS: 100 INJECTION, SOLUTION PARENTERAL at 07:36

## 2024-05-02 RX ADMIN — SENNOSIDES AND DOCUSATE SODIUM 2 TABLET: 50; 8.6 TABLET ORAL at 17:17

## 2024-05-02 RX ADMIN — INSULIN HUMAN 2 UNITS: 100 INJECTION, SOLUTION PARENTERAL at 11:35

## 2024-05-02 RX ADMIN — ENOXAPARIN SODIUM 40 MG: 100 INJECTION SUBCUTANEOUS at 17:17

## 2024-05-02 RX ADMIN — LIDOCAINE HYDROCHLORIDE 15 ML: 20 SOLUTION ORAL at 09:42

## 2024-05-02 RX ADMIN — METFORMIN HYDROCHLORIDE 500 MG: 500 TABLET ORAL at 09:41

## 2024-05-02 ASSESSMENT — ENCOUNTER SYMPTOMS
DEPRESSION: 1
VOMITING: 0
FEVER: 0
DIZZINESS: 0
DOUBLE VISION: 0
TINGLING: 0
NAUSEA: 0
WEIGHT LOSS: 0
NECK PAIN: 0
SENSORY CHANGE: 0
TREMORS: 0
COUGH: 0
CHILLS: 0
BLURRED VISION: 0
BACK PAIN: 0
PALPITATIONS: 0
PHOTOPHOBIA: 0
HEMOPTYSIS: 0
HEARTBURN: 0
SPEECH CHANGE: 0
MYALGIAS: 0
ORTHOPNEA: 0

## 2024-05-02 ASSESSMENT — FIBROSIS 4 INDEX: FIB4 SCORE: 1.52

## 2024-05-02 ASSESSMENT — PAIN DESCRIPTION - PAIN TYPE
TYPE: ACUTE PAIN
TYPE: ACUTE PAIN

## 2024-05-02 NOTE — PROGRESS NOTES
Pt has no substantial skin issues.     Head WDL  Ears Redness and Blanching  Nose WDL  Mouth WDL  Neck WDL  Breast/Chest WDL  Shoulder Blades Redness and Blanching  Spine Redness and Blanching  (R) Arm/Elbow/Hand Redness and Blanching  (L) Arm/Elbow/Hand Redness and Blanching  Abdomen WDL  Groin WDL  Scrotum/Coccyx/Buttocks Redness and Blanching  (R) Leg WDL  (L) Leg WDL  (R) Heel/Foot/Toe Redness and Blanching  (L) Heel/Foot/Toe Redness and Blanching

## 2024-05-02 NOTE — DISCHARGE SUMMARY
Discharge Summary    CHIEF COMPLAINT ON ADMISSION  Chief Complaint   Patient presents with    Drug Overdose     BIB [facility] from home in [place].  found her in chair altered. Reports pt took a whole bottle of Seroquel, unknown dosage. GCS 7-8 on arrival. Unable to maintain airway. Pt desat, brunilda down to 50s, 100 ketamine and 80 carola for RSI @ 2035, 2 pushes of 500mcg epi for hypotension/bradycardia, 7.0 tube.        Reason for Admission  EMS     Admission Date  4/29/2024    CODE STATUS  Full Code    HPI & HOSPITAL COURSE    54 y.o. female who presented 4/29/2024 with purposeful drug overdose.  Mrs. Kayleigh Lazo is a 54-year-old with past medical history of type 2 diabetes mellitus, depression, hypothyroidism, dyslipidemia that was brought to the hospital on 4/29/2024 after her  found her altered at home after reportedly taking an entire bottle of Seroquel.  She was hypoxic with bradycardia and was intubated prior to arrival due to airway protection.  She required 2 pushes of epinephrine due to hypotension. In the emergency room, she had an anion gap of 17 glucose 290 with negative acetaminophen and negative salicylic acid level and QTc of 485.  Her blood gas read initially was a pH of 7.18 with a pCO2 of 61 and pO2 of 142.  Poison control was called.  She was admitted in guarded condition on mechanical ventilation to the intensive care unit.  She was subsequently extubated.  After extubation she has made it very clear that she has no intention of living and that she plans on suicide when she gets the opportunity.  A legal hold was initiated and psychiatry has been consulted.         We initially held patient's Lexapro and Seroquel.  We monitored patient's on telemetry.  There is no evidence of any life-threatening arrhythmia.  Patient's QTc did improve and patient was able to safely resume her psychiatric medication of Lexapro and Seroquel.  Patient continued on Synthroid for hypothyroidism and  metformin for diabetes.  Patient remained on a legal hold and was medically cleared to be transferred to inpatient psych on 5/2/2024      Therefore, she is discharged in good and stable condition to a psychiatric hospital.    The patient met 2-midnight criteria for an inpatient stay at the time of discharge.    Discharge Date  5/2    FOLLOW UP ITEMS POST DISCHARGE      DISCHARGE DIAGNOSES  Principal Problem (Resolved):    Drug overdose, intentional self-harm, initial encounter (HCC) (POA: Yes)  Active Problems:    Type 2 diabetes mellitus with hyperglycemia (HCC) (POA: Yes)    Hypothyroidism (POA: Yes)    Major depressive disorder (POA: Yes)  Resolved Problems:    Acute respiratory failure with hypoxia (HCC) (POA: Yes)    Hypokalemia (POA: Yes)    Aspiration into respiratory tract (POA: Yes)    Drug overdose, intentional, initial encounter ([facility]) (POA: Yes)      FOLLOW UP  No future appointments.  No follow-up provider specified.    MEDICATIONS ON DISCHARGE     Medication List        CONTINUE taking these medications        Instructions   escitalopram 10 MG Tabs  Commonly known as: Lexapro   Take 10 mg by mouth every day.  Dose: 10 mg     gabapentin 100 MG Caps  Commonly known as: Neurontin   Take 100 mg by mouth 3 times a day.  Dose: 100 mg     hydrOXYzine pamoate 25 MG Caps  Commonly known as: Vistaril   Take 25 mg by mouth every day.  Dose: 25 mg     levothyroxine 150 MCG Tabs  Commonly known as: Synthroid   Take 150 mcg by mouth every day.  Dose: 150 mcg     metFORMIN 500 MG Tabs  Commonly known as: Glucophage   Take 500 mg by mouth 2 times a day with meals.  Dose: 500 mg     QUEtiapine 50 MG tablet  Commonly known as: SEROquel   Take 50 mg by mouth every day.  Dose: 50 mg     rosuvastatin 20 MG Tabs  Commonly known as: Crestor   Take 20 mg by mouth every day.  Dose: 20 mg              Allergies  No Known Allergies    DIET  Orders Placed This Encounter   Procedures    Diet Order Diet: Regular; Tray Modifications  (optional): Safety Tray - Plastic dishware, utensils unwrapped (Suicide Watch)     Paperware only on meal tray.     Standing Status:   Standing     Number of Occurrences:   1     Order Specific Question:   Diet:     Answer:   Regular [1]     Order Specific Question:   Tray Modifications (optional)     Answer:   Safety Tray - Plastic dishware, utensils unwrapped (Suicide Watch)       ACTIVITY  As tolerated.  Weight bearing as tolerated    CONSULTATIONS      PROCEDURES      LABORATORY  Lab Results   Component Value Date    SODIUM 138 05/02/2024    POTASSIUM 4.1 05/02/2024    CHLORIDE 102 05/02/2024    CO2 24 05/02/2024    GLUCOSE 130 (H) 05/02/2024    BUN 11 05/02/2024    CREATININE 0.51 05/02/2024        Lab Results   Component Value Date    WBC 5.1 05/02/2024    HEMOGLOBIN 12.3 05/02/2024    HEMATOCRIT 38.0 05/02/2024    PLATELETCT 211 05/02/2024        Total time of the discharge process exceeds 39 minutes.

## 2024-05-02 NOTE — CARE PLAN
Problem: Knowledge Deficit - Standard  Goal: Patient and family/care givers will demonstrate understanding of plan of care, disease process/condition, diagnostic tests and medications  Outcome: Progressing     Problem: Provide Safe Environment  Goal: Suicide environmental safety, protocols, policies, and practices will be implemented  Outcome: Progressing     Problem: Pain - Standard  Goal: Alleviation of pain or a reduction in pain to the patient’s comfort goal  Outcome: Progressing   The patient is Stable - Low risk of patient condition declining or worsening    Shift Goals  Clinical Goals: safety  Patient Goals: rest  Family Goals: gely    Progress made toward(s) clinical / shift goals:  Plan of care discussed with patient.     Patient is not progressing towards the following goals:

## 2024-05-02 NOTE — DISCHARGE PLANNING
[facility] ALERT TEAM DISCHARGE PLANNING NOTE     Date:  5/2/2024  Patient Name:  Kayleigh Lazo - 54 y.o. - Discharge Planning  MRN:  [id number]   YOB: 1970  ADMISSION DATE:  4/29/2024      Writer forwarded transfer packet for inpatient psychiatric care to the following community providers:  [facility], [facility], [facility].              Items included in the transfer packet:              ___x_Face Sheet              __x___Pages 1 and 2 of completed legal hold              __x___Alert Team/Psych Assessment              __x___H&P              __x__UDS              __x__Blood Alcohol              _x___Vital signs              ____Pregnancy Test (if applicable)              __x__Medications List              __x__Covid Screen

## 2024-05-02 NOTE — PROGRESS NOTES
Hospital Medicine Daily Progress Note    Date of Service  5/2/2024    Chief Complaint  Ck Chicas is a 54 y.o. female admitted 4/29/2024 with     Hospital Course  54 y.o. female who presented 4/29/2024 with purposeful drug overdose.  Mrs. Kayleigh Lazo is a 54-year-old with past medical history of type 2 diabetes mellitus, depression, hypothyroidism, dyslipidemia that was brought to the hospital on 4/29/2024 after her  found her altered at home after reportedly taking an entire bottle of Seroquel.  She was hypoxic with bradycardia and was intubated prior to arrival due to airway protection.  She required 2 pushes of epinephrine due to hypotension. In the emergency room, she had an anion gap of 17 glucose 290 with negative acetaminophen and negative salicylic acid level and QTc of 485.  Her blood gas read initially was a pH of 7.18 with a pCO2 of 61 and pO2 of 142.  Poison control was called.  She was admitted in guarded condition on mechanical ventilation to the intensive care unit.  She was subsequently extubated.  After extubation she has made it very clear that she has no intention of living and that she plans on suicide when she gets the opportunity.  A legal hold was initiated and psychiatry has been consulted.       Interval Problem Update    The patient has no new overnight issues    Patient is agreeable to going to inpatient psych    We are awaiting bed availability  I have discussed this patient's plan of care and discharge plan at IDT rounds today with Case Management, Nursing, Nursing leadership, and other members of the IDT team.    Consultants/Specialty  physiatry    Code Status  Full Code    Disposition  The patient is medically cleared for discharge to home or a post-acute facility.  Anticipate discharge to: a psychiatric hospital    I have placed the appropriate orders for post-discharge needs.    Review of Systems  Review of Systems   Constitutional:  Negative for chills, fever,  malaise/fatigue and weight loss.   HENT:  Negative for hearing loss and tinnitus.    Eyes:  Negative for blurred vision, double vision and photophobia.   Respiratory:  Negative for cough and hemoptysis.    Cardiovascular:  Negative for chest pain, palpitations and orthopnea.   Gastrointestinal:  Negative for heartburn, nausea and vomiting.   Genitourinary:  Negative for dysuria and urgency.   Musculoskeletal:  Negative for back pain, myalgias and neck pain.   Neurological:  Negative for dizziness, tingling, tremors, sensory change and speech change.   Psychiatric/Behavioral:  Positive for depression.         Physical Exam  Temp:  [36.6 °C (97.9 °F)-37.1 °C (98.7 °F)] 36.6 °C (97.9 °F)  Pulse:  [62-89] 71  Resp:  [16-22] 16  BP: (114-156)/(66-85) 140/82  SpO2:  [91 %-94 %] 93 %    Physical Exam  Vitals reviewed.   Constitutional:       General: She is not in acute distress.     Appearance: She is not ill-appearing, toxic-appearing or diaphoretic.   HENT:      Nose: Nose normal.      Mouth/Throat:      Mouth: Mucous membranes are moist.   Eyes:      Extraocular Movements: Extraocular movements intact.      Pupils: Pupils are equal, round, and reactive to light.   Cardiovascular:      Rate and Rhythm: Normal rate and regular rhythm.      Heart sounds: No murmur heard.     No gallop.   Pulmonary:      Effort: No respiratory distress.      Breath sounds: No stridor. No rhonchi.   Abdominal:      General: There is no distension.      Palpations: There is no mass.      Tenderness: There is no abdominal tenderness. There is no left CVA tenderness.      Hernia: No hernia is present.   Musculoskeletal:         General: No swelling, tenderness or deformity. Normal range of motion.      Cervical back: Normal range of motion.      Right lower leg: No edema.   Skin:     General: Skin is dry.      Capillary Refill: Capillary refill takes 2 to 3 seconds.      Coloration: Skin is not jaundiced or pale.      Findings: No bruising,  erythema or lesion.   Neurological:      General: No focal deficit present.      Mental Status: She is oriented to person, place, and time.      Cranial Nerves: No cranial nerve deficit.      Motor: No weakness.   Psychiatric:         Mood and Affect: Mood normal.         Behavior: Behavior normal.         Fluids    Intake/Output Summary (Last 24 hours) at 5/2/2024 1035  Last data filed at 5/2/2024 0900  Gross per 24 hour   Intake 3950 ml   Output --   Net 3950 ml       Laboratory  Recent Labs     04/30/24  0451 05/01/24  0440 05/02/24  0348   WBC 8.7 6.9 5.1   RBC 3.83* 3.93* 4.27   HEMOGLOBIN 11.0* 11.5* 12.3   HEMATOCRIT 34.0* 34.6* 38.0   MCV 88.8 88.0 89.0   MCH 28.7 29.3 28.8   MCHC 32.4 33.2 32.4   RDW 43.8 45.7 45.0   PLATELETCT 200 170 211   MPV 10.9 10.5 10.5     Recent Labs     04/30/24  1716 05/01/24  0440 05/02/24  0348   SODIUM 140 137 138   POTASSIUM 3.7 3.9 4.1   CHLORIDE 106 104 102   CO2 22 24 24   GLUCOSE 166* 136* 130*   BUN 11 10 11   CREATININE 0.68 0.57 0.51   CALCIUM 8.0* 8.0* 8.8                   Imaging  DX-CHEST-PORTABLE (1 VIEW)   Final Result         1.  No acute cardiopulmonary disease.      DX-ABDOMEN FOR TUBE PLACEMENT   Final Result         1.  Nonspecific bowel gas pattern in the upper abdomen.   2.  Nasogastric tube tip terminates overlying the expected location of the gastric antrum.   3.  Left basilar atelectasis or subtle infiltrates      DX-CHEST-PORTABLE (1 VIEW)   Final Result         1.  Left infrahilar infiltrate, decreased since prior study.   2.  Cardiomegaly      DX-CHEST-PORTABLE (1 VIEW)   Final Result         1.  Left basilar atelectasis and/or infiltrate.   2.  Trace left pleural effusion           Assessment/Plan  * Drug overdose, intentional self-harm, initial encounter (HCC)- (present on admission)  Assessment & Plan  Purposeful Seroquel overdose requiring intubation and ICU admission  She remains at risk of QT interval prolongation  Check an EKG in the morning  to evaluate QTc and consider starting medications if it is not prolonged  She remains on legal hold  Psychiatry consulted  If her QTc is appropriate she is likely to be medically optimized on 5/2/2024 for inpatient psychiatric hospitalization    Major depressive disorder- (present on admission)  Assessment & Plan  Hold on home meds  Psychiatry consulted      Hypothyroidism- (present on admission)  Assessment & Plan  TSH is 3.4  Synthroid 150 mcg daily    Type 2 diabetes mellitus with hyperglycemia (HCC)- (present on admission)  Assessment & Plan  She is on metformin 500 mg twice a day as an outpatient  With significant hyperglycemia on admission as her glucose was 290 in the emergency room given the stress response  Diabetic diet and sliding scale insulin for now.  metformin    Hypokalemia- (present on admission)  Assessment & Plan  Potassium on admission was 2.9 and has been given and is now up to 3.9.    Acute respiratory failure with hypoxia (HCC)- (present on admission)  Assessment & Plan  Secondary to purposeful overdose requiring intubation and mechanical ventilation  Now resolved         VTE prophylaxis:   SCDs/TEDs      I have performed a physical exam and reviewed and updated ROS and Plan today (5/2/2024). In review of yesterday's note (5/1/2024), there are no changes except as documented above.    Greater than 38  minutes spent prepping to see patient (e.g. review of tests) obtaining and/or reviewing separately obtained history. Performing a medically appropriate examination and/ evaluation.  Counseling and educating the patient/family/caregiver.  Ordering medications, tests, or procedures.  Referring and communicating with other health care professionals.  Documenting clinical information in EPIC.  Independently interpreting results and communicating results to patient/family/caregiver.  Care coordination.

## 2024-05-02 NOTE — PROGRESS NOTES
4 Eyes Skin Assessment Completed by CATHERINE Ribera and CATHERINE Dominguez.    Head WDL  Ears Redness and Blanching  Nose WDL  Mouth WDL  Neck Redness and Blanching  Breast/Chest WDL  Shoulder Blades WDL  Spine WDL  (R) Arm/Elbow/Hand WDL  (L) Arm/Elbow/Hand WDL  Abdomen WDL  Groin WDL  Scrotum/Coccyx/Buttocks WDL  (R) Leg WDL  (L) Leg WDL  (R) Heel/Foot/Toe WDL  (L) Heel/Foot/Toe WDL          Devices In Places Tele Box      Interventions In Place Pillows    Possible Skin Injury No    Pictures Uploaded Into Epic N/A  Wound Consult Placed N/A  RN Wound Prevention Protocol Ordered No

## 2024-05-02 NOTE — CARE PLAN
The patient is Stable - Low risk of patient condition declining or worsening    Shift Goals  Clinical Goals: VSS, monitor for pain, wean supplemental o2, safety/1:1 sitter, mobilize/ambulate ad kobe  Patient Goals: complete SA  Family Goals: gely    Progress made toward(s) clinical / shift goals:    Problem: Knowledge Deficit - Standard  Goal: Patient and family/care givers will demonstrate understanding of plan of care, disease process/condition, diagnostic tests and medications  Outcome: Progressing     Problem: Provide Safe Environment  Goal: Suicide environmental safety, protocols, policies, and practices will be implemented  Outcome: Progressing  Flowsheets (Taken 5/1/2024 2300)  Safety Interventions:   Patient Room Close to Nursing Station   Patient Wearing Hospital Clothing   Personal Clothing / Belongings Removed (Comment: Storage Location)   Potentially Dangerous Items Removed from room   Plastic Utensils / Paper Ware Ordered   Provided Safety Education   Discussed no self harm or elopement and safe behavior with patient   Patient Accompanied by Unit Staff when off Unit.   Medically necessary equipment present, hourly room safety check, and post-meal tray check.     Problem: Pain - Standard  Goal: Alleviation of pain or a reduction in pain to the patient’s comfort goal  Outcome: Progressing     Problem: Skin Integrity  Goal: Skin integrity is maintained or improved  Outcome: Progressing     Problem: Fall Risk  Goal: Patient will remain free from falls  Outcome: Progressing     Problem: Respiratory  Goal: Patient will achieve/maintain optimum respiratory ventilation and gas exchange  Outcome: Progressing  Flowsheets (Taken 5/1/2024 5360)  O2 Delivery Device: None - Room Air  Note: Pt on room air 92%       Patient is not progressing towards the following goals:

## 2024-05-02 NOTE — HOSPITAL COURSE
54 y.o. female who presented 4/29/2024 with purposeful drug overdose.  Mrs. Kayleigh Lazo is a 54-year-old with past medical history of type 2 diabetes mellitus, depression, hypothyroidism, dyslipidemia that was brought to the hospital on 4/29/2024 after her  found her altered at home after reportedly taking an entire bottle of Seroquel.  She was hypoxic with bradycardia and was intubated prior to arrival due to airway protection.  She required 2 pushes of epinephrine due to hypotension. In the emergency room, she had an anion gap of 17 glucose 290 with negative acetaminophen and negative salicylic acid level and QTc of 485.  Her blood gas read initially was a pH of 7.18 with a pCO2 of 61 and pO2 of 142.  Poison control was called.  She was admitted in guarded condition on mechanical ventilation to the intensive care unit.  She was subsequently extubated.  After extubation she has made it very clear that she has no intention of living and that she plans on suicide when she gets the opportunity.  A legal hold was initiated and psychiatry has been consulted.

## 2024-05-02 NOTE — DISCHARGE PLANNING
Notice of Medical Clerance filed to the court via [employer] notifying the court that pt is now medically clear, scanned copy into pt's chart.

## 2024-05-02 NOTE — CARE PLAN
The patient is Stable - Low risk of patient condition declining or worsening    Shift Goals  Clinical Goals: VSS, monitor for pain, wean supplemental o2, safety/1:1 sitter, mobilize/ambulate ad kobe  Patient Goals: complete SA  Family Goals: gely    Progress made toward(s) clinical / shift goals:    Problem: Provide Safe Environment  Goal: Suicide environmental safety, protocols, policies, and practices will be implemented  Outcome: Progressing  Flowsheets (Taken 5/1/2024 0800)  Safety Interventions:   Patient Wearing Hospital Clothing   Potentially Dangerous Items Removed from room   Provided Safety Education   Patient Accompanied by Unit Staff when off Unit.   Medically necessary equipment present, hourly room safety check, and post-meal tray check.   Discussed no self harm or elopement and safe behavior with patient   Plastic Utensils / Paper Ware Ordered   Personal Clothing / Belongings Removed (Comment: Storage Location)   Patient Room Close to Nursing Station     Problem: Pain - Standard  Goal: Alleviation of pain or a reduction in pain to the patient’s comfort goal  Outcome: Progressing  Note: Pt denying pain for this RN.     Problem: Skin Integrity  Goal: Skin integrity is maintained or improved  Outcome: Progressing  Note: Interventions in place.     Problem: Fall Risk  Goal: Patient will remain free from falls  Outcome: Progressing  Note: Interventions in place.     Problem: Respiratory  Goal: Patient will achieve/maintain optimum respiratory ventilation and gas exchange  Outcome: Progressing  Note: Successfully able to wean pt off supplemental o2.     Problem: Psychosocial  Goal: Patient's ability to verbalize feelings about condition will improve  Outcome: Progressing  Note: Psych consult and shower appeared to help pt's mood/affect.     Problem: Risk for Aspiration  Goal: Patient's risk for aspiration will be absent or decrease  Outcome: Progressing  Note: No evidence of aspiration observed.     Problem:  Urinary - Renal Perfusion  Goal: Ability to achieve and maintain adequate renal perfusion and functioning will improve  Outcome: Progressing     Problem: Nutrition  Goal: Patient's nutritional and fluid intake will be adequate or improve  Outcome: Progressing  Note: Pt eating 100% of meals.     Problem: Urinary Elimination  Goal: Establish and maintain regular urinary output  Outcome: Progressing  Note: Kelley discontinued per protocol.  Pt voiding adquately.

## 2024-05-02 NOTE — PROGRESS NOTES
Bedside report received from off going RN/tech: Anastasia assumed care of patient.     Fall Risk Score: LOW RISK  Fall risk interventions in place: Place yellow fall risk ID band on patient, Provide patient/family education based on risk assessment, Educate patient/family to call staff for assistance when getting out of bed, Place fall precaution signage outside patient door, and Place patient in room close to nursing station  Bed type: Regular (Kip Score less than 17 interventions in place)  Patient on cardiac monitor: Yes  IVF/IV medications: Not Applicable   Oxygen: Room Air  Bedside sitter: Pt on L2k SI with 1:1 sitter Blank (name), Report given to sitter, checklist completed, and checklist completed, stop sign in doorway  Isolation: Not applicable

## 2024-05-02 NOTE — DISCHARGE PLANNING
Case Management Discharge Planning    Admission Date: 4/29/2024  GMLOS: 3.9  ALOS: 3    6-Clicks ADL Score: 24  6-Clicks Mobility Score: 24      Anticipated Discharge Dispo: Discharge Disposition: D/T to psych hosp or distinct part unit (65)      Action(s) Taken: LMSW reached out to Alert team to notify them that pt is MC to DC to inpatient psych per MD.Moors in IDT rounds.     1133 LMSW reached out to MD to complete section 2 of the legal hold paperwork stating pt is MC. In order for LMSW to send to alert team paperwork to Alert team.     1222 LMSW sent over page 2 signed by MD to alert team stating pt is MC. Pending Alert team placement.

## 2024-05-02 NOTE — PROGRESS NOTES
Bedside report received from off going RN/tech: Eliceo, assumed care of patient.     Fall Risk Score: LOW RISK  Fall risk interventions in place: Provide patient/family education based on risk assessment, Educate patient/family to call staff for assistance when getting out of bed, Place fall precaution signage outside patient door, Place patient in room close to nursing station, and Utilize bed/chair fall alarm  Bed type: Regular (Kip Score less than 17 interventions in place)  Patient on cardiac monitor: Yes  IVF/IV medications: Not Applicable   Oxygen: Room Air  Bedside sitter:   Isolation: Not applicable        Bedside report received from Eliceo, pt stated to have suicidal thoughts at the moment and has no plans of living and thinks its her right to choose not to live. Pt plans to go end life after being discharged from the hospital. A&OX4, pt placed on tele, monitor room notified,pt  was updated on plan of care. Call light, phone and personal belongings in reach. Bed alarm on and working properly, bed in lowest position, and locked.

## 2024-05-03 NOTE — PROGRESS NOTES
Bedside report received from off going RN/tech: Keena, assumed care of patient.     Fall Risk Score: LOW RISK  Fall risk interventions in place: Provide patient/family education based on risk assessment, Educate patient/family to call staff for assistance when getting out of bed, Place fall precaution signage outside patient door, and Place patient in room close to nursing station, safety sitter   Bed type: Regular (Kip Score less than 17 interventions in place)  Patient on cardiac monitor: Yes  IVF/IV medications: Not Applicable   Oxygen: Room Air  Bedside sitter: Pt on L2k SI with 1:1 sitter Bethany (name), Report given to sitter, checklist completed, and checklist completed, stop sign in doorway  Isolation: Not applicable

## 2024-05-28 LAB
FUNGUS SPEC CULT: NORMAL
FUNGUS SPEC FUNGUS STN: NORMAL
SIGNIFICANT IND 70042: NORMAL
SITE SITE: NORMAL
SOURCE SOURCE: NORMAL

## 2024-06-12 LAB
MYCOBACTERIUM SPEC CULT: NORMAL
RHODAMINE-AURAMINE STN SPEC: NORMAL
SIGNIFICANT IND 70042: NORMAL
SITE SITE: NORMAL
SOURCE SOURCE: NORMAL